# Patient Record
Sex: MALE | Race: WHITE | Employment: PART TIME | URBAN - METROPOLITAN AREA
[De-identification: names, ages, dates, MRNs, and addresses within clinical notes are randomized per-mention and may not be internally consistent; named-entity substitution may affect disease eponyms.]

---

## 2024-07-20 ENCOUNTER — HOSPITAL ENCOUNTER (INPATIENT)
Age: 74
LOS: 3 days | Discharge: HOME | End: 2024-07-23
Attending: STUDENT IN AN ORGANIZED HEALTH CARE EDUCATION/TRAINING PROGRAM | Admitting: HOSPITALIST
Payer: MEDICARE

## 2024-07-20 ENCOUNTER — APPOINTMENT (OUTPATIENT)
Dept: GENERAL RADIOLOGY | Age: 74
End: 2024-07-20
Payer: MEDICARE

## 2024-07-20 ENCOUNTER — APPOINTMENT (OUTPATIENT)
Dept: ULTRASOUND IMAGING | Age: 74
End: 2024-07-20
Payer: MEDICARE

## 2024-07-20 DIAGNOSIS — A41.9 SEPSIS, DUE TO UNSPECIFIED ORGANISM, UNSPECIFIED WHETHER ACUTE ORGAN DYSFUNCTION PRESENT (HCC): ICD-10-CM

## 2024-07-20 DIAGNOSIS — U07.1 COVID-19: Primary | ICD-10-CM

## 2024-07-20 DIAGNOSIS — R11.2 NAUSEA AND VOMITING, UNSPECIFIED VOMITING TYPE: ICD-10-CM

## 2024-07-20 DIAGNOSIS — I16.0 HYPERTENSIVE URGENCY: ICD-10-CM

## 2024-07-20 DIAGNOSIS — A49.9 UTI (URINARY TRACT INFECTION), BACTERIAL: ICD-10-CM

## 2024-07-20 DIAGNOSIS — N17.9 AKI (ACUTE KIDNEY INJURY) (HCC): ICD-10-CM

## 2024-07-20 DIAGNOSIS — N39.0 UTI (URINARY TRACT INFECTION), BACTERIAL: ICD-10-CM

## 2024-07-20 PROBLEM — N18.31 CKD STAGE 3A, GFR 45-59 ML/MIN (HCC): Status: ACTIVE | Noted: 2024-07-20

## 2024-07-20 PROBLEM — R65.20 SEPSIS WITH ACUTE RENAL FAILURE WITHOUT SEPTIC SHOCK, DUE TO UNSPECIFIED ORGANISM, UNSPECIFIED ACUTE RENAL FAILURE TYPE (HCC): Status: ACTIVE | Noted: 2024-07-20

## 2024-07-20 PROBLEM — J12.82 PNEUMONIA DUE TO COVID-19 VIRUS: Status: ACTIVE | Noted: 2024-07-20

## 2024-07-20 PROBLEM — R79.89 ELEVATED TROPONIN: Status: ACTIVE | Noted: 2024-07-20

## 2024-07-20 PROBLEM — N30.00 ACUTE CYSTITIS WITHOUT HEMATURIA: Status: ACTIVE | Noted: 2024-07-20

## 2024-07-20 PROBLEM — R65.20 SEPSIS WITH ACUTE RENAL FAILURE WITHOUT SEPTIC SHOCK (HCC): Status: ACTIVE | Noted: 2024-07-20

## 2024-07-20 LAB
ALBUMIN SERPL-MCNC: 3.7 G/DL (ref 3.5–5.2)
ALP SERPL-CCNC: 101 U/L (ref 40–129)
ALT SERPL-CCNC: 48 U/L (ref 0–40)
ANION GAP SERPL CALCULATED.3IONS-SCNC: 11 MMOL/L (ref 7–16)
AST SERPL-CCNC: 35 U/L (ref 0–39)
B PARAP IS1001 DNA NPH QL NAA+NON-PROBE: NOT DETECTED
B PERT DNA SPEC QL NAA+PROBE: NOT DETECTED
BACTERIA URNS QL MICRO: ABNORMAL
BASOPHILS # BLD: 0.04 K/UL (ref 0–0.2)
BASOPHILS NFR BLD: 0 % (ref 0–2)
BILIRUB SERPL-MCNC: 0.5 MG/DL (ref 0–1.2)
BILIRUB UR QL STRIP: NEGATIVE
BUN SERPL-MCNC: 30 MG/DL (ref 6–23)
C PNEUM DNA NPH QL NAA+NON-PROBE: NOT DETECTED
CALCIUM SERPL-MCNC: 8.4 MG/DL (ref 8.6–10.2)
CHLORIDE SERPL-SCNC: 102 MMOL/L (ref 98–107)
CLARITY UR: CLEAR
CO2 SERPL-SCNC: 22 MMOL/L (ref 22–29)
COLOR UR: YELLOW
CREAT SERPL-MCNC: 1.8 MG/DL (ref 0.7–1.2)
D-DIMER QUANTITATIVE: 637 NG/ML DDU (ref 0–230)
EOSINOPHIL # BLD: 0.07 K/UL (ref 0.05–0.5)
EOSINOPHILS RELATIVE PERCENT: 1 % (ref 0–6)
ERYTHROCYTE [DISTWIDTH] IN BLOOD BY AUTOMATED COUNT: 13.2 % (ref 11.5–15)
FLUAV RNA NPH QL NAA+NON-PROBE: NOT DETECTED
FLUBV RNA NPH QL NAA+NON-PROBE: NOT DETECTED
GFR, ESTIMATED: 38 ML/MIN/1.73M2
GLUCOSE SERPL-MCNC: 121 MG/DL (ref 74–99)
GLUCOSE UR STRIP-MCNC: NEGATIVE MG/DL
HADV DNA NPH QL NAA+NON-PROBE: NOT DETECTED
HCOV 229E RNA NPH QL NAA+NON-PROBE: NOT DETECTED
HCOV HKU1 RNA NPH QL NAA+NON-PROBE: NOT DETECTED
HCOV NL63 RNA NPH QL NAA+NON-PROBE: NOT DETECTED
HCOV OC43 RNA NPH QL NAA+NON-PROBE: NOT DETECTED
HCT VFR BLD AUTO: 45.8 % (ref 37–54)
HGB BLD-MCNC: 15 G/DL (ref 12.5–16.5)
HGB UR QL STRIP.AUTO: ABNORMAL
HMPV RNA NPH QL NAA+NON-PROBE: NOT DETECTED
HPIV1 RNA NPH QL NAA+NON-PROBE: NOT DETECTED
HPIV2 RNA NPH QL NAA+NON-PROBE: NOT DETECTED
HPIV3 RNA NPH QL NAA+NON-PROBE: NOT DETECTED
HPIV4 RNA NPH QL NAA+NON-PROBE: NOT DETECTED
IMM GRANULOCYTES # BLD AUTO: 0.05 K/UL (ref 0–0.58)
IMM GRANULOCYTES NFR BLD: 0 % (ref 0–5)
INR PPP: 2.6
KETONES UR STRIP-MCNC: NEGATIVE MG/DL
LACTATE BLDV-SCNC: 2.1 MMOL/L (ref 0.5–1.9)
LACTATE BLDV-SCNC: 2.2 MMOL/L (ref 0.5–1.9)
LEUKOCYTE ESTERASE UR QL STRIP: ABNORMAL
LIPASE SERPL-CCNC: 27 U/L (ref 13–60)
LYMPHOCYTES NFR BLD: 0.25 K/UL (ref 1.5–4)
LYMPHOCYTES RELATIVE PERCENT: 2 % (ref 20–42)
M PNEUMO DNA NPH QL NAA+NON-PROBE: NOT DETECTED
MCH RBC QN AUTO: 32.6 PG (ref 26–35)
MCHC RBC AUTO-ENTMCNC: 32.8 G/DL (ref 32–34.5)
MCV RBC AUTO: 99.6 FL (ref 80–99.9)
MONOCYTES NFR BLD: 0.07 K/UL (ref 0.1–0.95)
MONOCYTES NFR BLD: 1 % (ref 2–12)
NEUTROPHILS NFR BLD: 97 % (ref 43–80)
NEUTS SEG NFR BLD: 13.09 K/UL (ref 1.8–7.3)
NITRITE UR QL STRIP: NEGATIVE
PH UR STRIP: 5.5 [PH] (ref 5–9)
PLATELET # BLD AUTO: 266 K/UL (ref 130–450)
PMV BLD AUTO: 10.2 FL (ref 7–12)
POTASSIUM SERPL-SCNC: 4.3 MMOL/L (ref 3.5–5)
PROT SERPL-MCNC: 7.7 G/DL (ref 6.4–8.3)
PROT UR STRIP-MCNC: NEGATIVE MG/DL
PROTHROMBIN TIME: 27.9 SEC (ref 9.3–12.4)
RBC # BLD AUTO: 4.6 M/UL (ref 3.8–5.8)
RBC # BLD: ABNORMAL 10*6/UL
RBC #/AREA URNS HPF: ABNORMAL /HPF
RSV RNA NPH QL NAA+NON-PROBE: NOT DETECTED
RV+EV RNA NPH QL NAA+NON-PROBE: NOT DETECTED
SARS-COV-2 RDRP RESP QL NAA+PROBE: DETECTED
SARS-COV-2 RNA NPH QL NAA+NON-PROBE: NOT DETECTED
SODIUM SERPL-SCNC: 135 MMOL/L (ref 132–146)
SP GR UR STRIP: 1.01 (ref 1–1.03)
SPECIMEN DESCRIPTION: ABNORMAL
SPECIMEN DESCRIPTION: NORMAL
TROPONIN I SERPL HS-MCNC: 26 NG/L (ref 0–11)
TROPONIN I SERPL HS-MCNC: 28 NG/L (ref 0–11)
UROBILINOGEN UR STRIP-ACNC: 0.2 EU/DL (ref 0–1)
WBC #/AREA URNS HPF: ABNORMAL /HPF
WBC OTHER # BLD: 13.6 K/UL (ref 4.5–11.5)

## 2024-07-20 PROCEDURE — 6370000000 HC RX 637 (ALT 250 FOR IP): Performed by: STUDENT IN AN ORGANIZED HEALTH CARE EDUCATION/TRAINING PROGRAM

## 2024-07-20 PROCEDURE — 85610 PROTHROMBIN TIME: CPT

## 2024-07-20 PROCEDURE — 87086 URINE CULTURE/COLONY COUNT: CPT

## 2024-07-20 PROCEDURE — 84484 ASSAY OF TROPONIN QUANT: CPT

## 2024-07-20 PROCEDURE — 96375 TX/PRO/DX INJ NEW DRUG ADDON: CPT

## 2024-07-20 PROCEDURE — 93005 ELECTROCARDIOGRAM TRACING: CPT

## 2024-07-20 PROCEDURE — 71045 X-RAY EXAM CHEST 1 VIEW: CPT

## 2024-07-20 PROCEDURE — 87635 SARS-COV-2 COVID-19 AMP PRB: CPT

## 2024-07-20 PROCEDURE — 87040 BLOOD CULTURE FOR BACTERIA: CPT

## 2024-07-20 PROCEDURE — 76770 US EXAM ABDO BACK WALL COMP: CPT

## 2024-07-20 PROCEDURE — 87077 CULTURE AEROBIC IDENTIFY: CPT

## 2024-07-20 PROCEDURE — 6360000002 HC RX W HCPCS

## 2024-07-20 PROCEDURE — 83690 ASSAY OF LIPASE: CPT

## 2024-07-20 PROCEDURE — 80053 COMPREHEN METABOLIC PANEL: CPT

## 2024-07-20 PROCEDURE — 96374 THER/PROPH/DIAG INJ IV PUSH: CPT

## 2024-07-20 PROCEDURE — 2060000000 HC ICU INTERMEDIATE R&B

## 2024-07-20 PROCEDURE — 0202U NFCT DS 22 TRGT SARS-COV-2: CPT

## 2024-07-20 PROCEDURE — 87154 CUL TYP ID BLD PTHGN 6+ TRGT: CPT

## 2024-07-20 PROCEDURE — 2580000003 HC RX 258

## 2024-07-20 PROCEDURE — 83605 ASSAY OF LACTIC ACID: CPT

## 2024-07-20 PROCEDURE — 99285 EMERGENCY DEPT VISIT HI MDM: CPT

## 2024-07-20 PROCEDURE — 81001 URINALYSIS AUTO W/SCOPE: CPT

## 2024-07-20 PROCEDURE — 96361 HYDRATE IV INFUSION ADD-ON: CPT

## 2024-07-20 PROCEDURE — 2500000003 HC RX 250 WO HCPCS

## 2024-07-20 PROCEDURE — 85379 FIBRIN DEGRADATION QUANT: CPT

## 2024-07-20 PROCEDURE — 99223 1ST HOSP IP/OBS HIGH 75: CPT | Performed by: HOSPITALIST

## 2024-07-20 PROCEDURE — 6370000000 HC RX 637 (ALT 250 FOR IP)

## 2024-07-20 PROCEDURE — 85025 COMPLETE CBC W/AUTO DIFF WBC: CPT

## 2024-07-20 RX ORDER — ACETAMINOPHEN 500 MG
1000 TABLET ORAL ONCE
Status: COMPLETED | OUTPATIENT
Start: 2024-07-20 | End: 2024-07-20

## 2024-07-20 RX ORDER — 0.9 % SODIUM CHLORIDE 0.9 %
1655 INTRAVENOUS SOLUTION INTRAVENOUS ONCE
Status: COMPLETED | OUTPATIENT
Start: 2024-07-20 | End: 2024-07-20

## 2024-07-20 RX ORDER — 0.9 % SODIUM CHLORIDE 0.9 %
1000 INTRAVENOUS SOLUTION INTRAVENOUS ONCE
Status: COMPLETED | OUTPATIENT
Start: 2024-07-20 | End: 2024-07-20

## 2024-07-20 RX ORDER — IBUPROFEN 600 MG/1
600 TABLET ORAL ONCE
Status: COMPLETED | OUTPATIENT
Start: 2024-07-20 | End: 2024-07-20

## 2024-07-20 RX ADMIN — WATER 2000 MG: 1 INJECTION INTRAMUSCULAR; INTRAVENOUS; SUBCUTANEOUS at 21:53

## 2024-07-20 RX ADMIN — ACETAMINOPHEN 1000 MG: 500 TABLET ORAL at 18:47

## 2024-07-20 RX ADMIN — SODIUM CHLORIDE 1000 ML: 9 INJECTION, SOLUTION INTRAVENOUS at 18:01

## 2024-07-20 RX ADMIN — SODIUM CHLORIDE 1655 ML: 9 INJECTION, SOLUTION INTRAVENOUS at 18:00

## 2024-07-20 RX ADMIN — DOXYCYCLINE 100 MG: 100 INJECTION, POWDER, LYOPHILIZED, FOR SOLUTION INTRAVENOUS at 21:54

## 2024-07-20 RX ADMIN — IBUPROFEN 600 MG: 600 TABLET, FILM COATED ORAL at 19:55

## 2024-07-20 ASSESSMENT — PAIN - FUNCTIONAL ASSESSMENT: PAIN_FUNCTIONAL_ASSESSMENT: NONE - DENIES PAIN

## 2024-07-20 ASSESSMENT — LIFESTYLE VARIABLES
HOW OFTEN DO YOU HAVE A DRINK CONTAINING ALCOHOL: NEVER
HOW MANY STANDARD DRINKS CONTAINING ALCOHOL DO YOU HAVE ON A TYPICAL DAY: PATIENT DOES NOT DRINK

## 2024-07-20 ASSESSMENT — PAIN SCALES - GENERAL: PAINLEVEL_OUTOF10: 0

## 2024-07-20 NOTE — ED PROVIDER NOTES
Vaping Use    Vaping Use: Never used   Substance Use Topics    Alcohol use: No    Drug use: No       SCREENINGS     Is this patient to be included in the SEP-1 core measure? Yes   SEP-1 CORE MEASURE DATA      Sepsis Criteria   Severe Sepsis Criteria   Septic Shock Criteria     Must be confirmed or suspected to move forward with diagnosis of sepsis.    Must meet 2:    [x] Temperature > 100.9 F (38.3 C)        or < 96.8 F (36 C)  [x] HR > 90  [] RR > 20  [x] WBC > 12 or < 4 or 10% bands      AND:      [x] Infection Confirmed or        Suspected.     Must meet 1:    [x] Lactate > 2       or   [] Signs of Organ Dysfunction:    - SBP < 90 or MAP < 65  - Altered mental status  - Creatinine > 2 or increased from      baseline  - Urine Output < 0.5 ml/kg/hr  - Bilirubin > 2  - INR > 1.5 (not anticoagulated)  - Platelets < 100,000  - Acute Respiratory Failure as     evidenced by new need for NIPPV     or mechanical ventilation      [] No criteria met for Severe Sepsis.   Must meet 1:    [] Lactate > 4        or   [] SBP < 90 or MAP < 65 for at        least two readings in the first        hour after fluid bolus        administration      [] Vasopressors initiated (if hypotension persists after fluid resuscitation)        [] No criteria met for Septic Shock.   Patient Vitals for the past 6 hrs:   BP Temp Pulse Resp SpO2   07/20/24 1755 -- -- (!) 101 26 98 %   07/20/24 1810 (!) 160/86 -- (!) 103 20 96 %   07/20/24 1825 -- -- (!) 106 (!) 32 96 %   07/20/24 1840 128/63 -- (!) 107 23 96 %   07/20/24 1855 -- -- (!) 112 25 97 %   07/20/24 1910 -- -- (!) 110 27 96 %   07/20/24 1921 -- -- (!) 114 19 --   07/20/24 1936 -- -- (!) 124 29 --   07/20/24 1943 (!) 152/94 (!) 103 °F (39.4 °C) (!) 111 22 93 %   07/20/24 1951 -- -- (!) 107 28 93 %   07/20/24 2006 123/73 -- (!) 105 26 93 %   07/20/24 2021 -- -- (!) 105 23 --   07/20/24 2036 -- -- (!) 103 21 --   07/20/24 2051 -- -- (!) 101 22 --   07/20/24 2106 -- -- (!) 101 21 --   07/20/24  otherwise negative for acute findings.  Patient has improved clinically and hemodynamically.  Spoke with hospitalist who accepts patient for admission.    Extensively reviewed laboratory and imaging findings with patient/family members/available representative parties and all questions answered at this time to the fullest extent possible.  Shared decision making utilized with patient and present parties, and due to their presenting conditions patient to be admitted to the hospital for inpatient management and monitoring.  Patient has remained closely monitored with multiple reevaluations and complex medical decision making throughout the course of their emergency department stay.  They have clinically improved and have hemodynamically remained stable.  After emergency department management is complete, patient to be admitted to the hospital.    Differential diagnosis includes but is not limited to: Pneumonia, ACS, pneumothorax, sepsis    Please refer to the ED Course as available for additional MDM.  ED Course as of 07/20/24 2351   Sat Jul 20, 2024   1832 SARS-CoV-2, Rapid(!): DETECTED [VG]   2025 Lactic Acid, Sepsis(!): 2.2 [VG]   2126 Spoke with Dr Reis, discussed case, patient is accepted for admission.  [VG]   2349 EKG: Sinus tachycardia at a rate of 103 bpm with left axis deviation and QTc of 453.  No significant ST changes or elevation with no prior to compare to at this time.  EKG interpreted by myself. [RW]      ED Course User Index  [RW] Dave Newberry DO  [VG] Sabrina Puente DO        Social Determinants affecting Dx or Tx: Patient has good medical compliance and fluency as well as established follow-up with family physician.    Records Reviewed: Prior hospital encounter on 8/30/2017 reviewed patient was evaluated for dental abscess.    I am the Primary Clinician of Record.    CONSULTS: (Who and What was discussed)  IP CONSULT TO NEPHROLOGY  IP CONSULT TO CASE MANAGEMENT    FINAL IMPRESSION

## 2024-07-21 ENCOUNTER — APPOINTMENT (OUTPATIENT)
Dept: CT IMAGING | Age: 74
End: 2024-07-21
Payer: MEDICARE

## 2024-07-21 ENCOUNTER — APPOINTMENT (OUTPATIENT)
Dept: GENERAL RADIOLOGY | Age: 74
End: 2024-07-21
Payer: MEDICARE

## 2024-07-21 LAB
ALBUMIN SERPL-MCNC: 3.8 G/DL (ref 3.5–5.2)
ALP SERPL-CCNC: 127 U/L (ref 40–129)
ALT SERPL-CCNC: 146 U/L (ref 0–40)
ANION GAP SERPL CALCULATED.3IONS-SCNC: 11 MMOL/L (ref 7–16)
ANION GAP SERPL CALCULATED.3IONS-SCNC: 20 MMOL/L (ref 7–16)
AST SERPL-CCNC: 82 U/L (ref 0–39)
B.E.: -15.3 MMOL/L (ref -3–3)
BASOPHILS # BLD: 0 K/UL (ref 0–0.2)
BASOPHILS # BLD: 0.01 K/UL (ref 0–0.2)
BASOPHILS NFR BLD: 0 % (ref 0–2)
BASOPHILS NFR BLD: 0 % (ref 0–2)
BILIRUB SERPL-MCNC: 0.4 MG/DL (ref 0–1.2)
BUN SERPL-MCNC: 29 MG/DL (ref 6–23)
BUN SERPL-MCNC: 32 MG/DL (ref 6–23)
CALCIUM SERPL-MCNC: 8.3 MG/DL (ref 8.6–10.2)
CALCIUM SERPL-MCNC: 8.9 MG/DL (ref 8.6–10.2)
CHLORIDE SERPL-SCNC: 102 MMOL/L (ref 98–107)
CHLORIDE SERPL-SCNC: 109 MMOL/L (ref 98–107)
CK SERPL-CCNC: 61 U/L (ref 20–200)
CO2 SERPL-SCNC: 18 MMOL/L (ref 22–29)
CO2 SERPL-SCNC: 21 MMOL/L (ref 22–29)
COHB: 0.5 % (ref 0–1.5)
CREAT SERPL-MCNC: 1.8 MG/DL (ref 0.7–1.2)
CREAT SERPL-MCNC: 1.9 MG/DL (ref 0.7–1.2)
CREAT UR-MCNC: 77 MG/DL (ref 40–278)
CRITICAL: ABNORMAL
DATE ANALYZED: ABNORMAL
DATE OF COLLECTION: ABNORMAL
EOSINOPHIL # BLD: 0 K/UL (ref 0.05–0.5)
EOSINOPHIL # BLD: 0.01 K/UL (ref 0.05–0.5)
EOSINOPHILS RELATIVE PERCENT: 0 % (ref 0–6)
EOSINOPHILS RELATIVE PERCENT: 0 % (ref 0–6)
ERYTHROCYTE [DISTWIDTH] IN BLOOD BY AUTOMATED COUNT: 13.3 % (ref 11.5–15)
ERYTHROCYTE [DISTWIDTH] IN BLOOD BY AUTOMATED COUNT: 13.6 % (ref 11.5–15)
ERYTHROCYTE [DISTWIDTH] IN BLOOD BY AUTOMATED COUNT: 13.7 % (ref 11.5–15)
GFR, ESTIMATED: 36 ML/MIN/1.73M2
GFR, ESTIMATED: 38 ML/MIN/1.73M2
GLUCOSE SERPL-MCNC: 127 MG/DL (ref 74–99)
GLUCOSE SERPL-MCNC: 129 MG/DL (ref 74–99)
HCO3: 13.3 MMOL/L (ref 22–26)
HCT VFR BLD AUTO: 43.3 % (ref 37–54)
HCT VFR BLD AUTO: 48.4 % (ref 37–54)
HCT VFR BLD AUTO: 48.8 % (ref 37–54)
HGB BLD-MCNC: 14.2 G/DL (ref 12.5–16.5)
HGB BLD-MCNC: 15.2 G/DL (ref 12.5–16.5)
HGB BLD-MCNC: 15.4 G/DL (ref 12.5–16.5)
HHB: 1.8 % (ref 0–5)
IMM GRANULOCYTES # BLD AUTO: 0.03 K/UL (ref 0–0.58)
IMM GRANULOCYTES NFR BLD: 1 % (ref 0–5)
INR PPP: 2.5
INR PPP: 4.1
LAB: ABNORMAL
LACTATE BLDV-SCNC: 1.7 MMOL/L (ref 0.5–1.9)
LACTATE BLDV-SCNC: 2 MMOL/L (ref 0.5–1.9)
LACTATE BLDV-SCNC: 2.5 MMOL/L (ref 0.5–2.2)
LACTATE BLDV-SCNC: 9.8 MMOL/L (ref 0.5–2.2)
LYMPHOCYTES NFR BLD: 0.42 K/UL (ref 1.5–4)
LYMPHOCYTES NFR BLD: 1.21 K/UL (ref 1.5–4)
LYMPHOCYTES RELATIVE PERCENT: 2 % (ref 20–42)
LYMPHOCYTES RELATIVE PERCENT: 21 % (ref 20–42)
Lab: 2035
MCH RBC QN AUTO: 32.1 PG (ref 26–35)
MCH RBC QN AUTO: 32.8 PG (ref 26–35)
MCH RBC QN AUTO: 32.9 PG (ref 26–35)
MCHC RBC AUTO-ENTMCNC: 31.4 G/DL (ref 32–34.5)
MCHC RBC AUTO-ENTMCNC: 31.6 G/DL (ref 32–34.5)
MCHC RBC AUTO-ENTMCNC: 32.8 G/DL (ref 32–34.5)
MCV RBC AUTO: 100.2 FL (ref 80–99.9)
MCV RBC AUTO: 102.1 FL (ref 80–99.9)
MCV RBC AUTO: 103.8 FL (ref 80–99.9)
METHB: 0.3 % (ref 0–1.5)
MONOCYTES NFR BLD: 0.17 K/UL (ref 0.1–0.95)
MONOCYTES NFR BLD: 1.05 K/UL (ref 0.1–0.95)
MONOCYTES NFR BLD: 3 % (ref 2–12)
MONOCYTES NFR BLD: 4 % (ref 2–12)
NEUTROPHILS NFR BLD: 75 % (ref 43–80)
NEUTROPHILS NFR BLD: 94 % (ref 43–80)
NEUTS SEG NFR BLD: 22.63 K/UL (ref 1.8–7.3)
NEUTS SEG NFR BLD: 4.24 K/UL (ref 1.8–7.3)
O2 CONTENT: 21.6 ML/DL
O2 SATURATION: 98.2 % (ref 92–98.5)
O2HB: 97.4 % (ref 94–97)
OPERATOR ID: 1115
PATIENT TEMP: 37 C
PCO2: 40.6 MMHG (ref 35–45)
PH BLOOD GAS: 7.13 (ref 7.35–7.45)
PLATELET # BLD AUTO: 220 K/UL (ref 130–450)
PLATELET # BLD AUTO: 256 K/UL (ref 130–450)
PLATELET # BLD AUTO: 363 K/UL (ref 130–450)
PMV BLD AUTO: 10.7 FL (ref 7–12)
PMV BLD AUTO: 10.9 FL (ref 7–12)
PMV BLD AUTO: 11 FL (ref 7–12)
PO2: 139.5 MMHG (ref 75–100)
POTASSIUM SERPL-SCNC: 4.1 MMOL/L (ref 3.5–5)
POTASSIUM SERPL-SCNC: 5.2 MMOL/L (ref 3.5–5)
PROCALCITONIN SERPL-MCNC: 12.4 NG/ML (ref 0–0.08)
PROT SERPL-MCNC: 8.2 G/DL (ref 6.4–8.3)
PROTHROMBIN TIME: 27.6 SEC (ref 9.3–12.4)
PROTHROMBIN TIME: 43.6 SEC (ref 9.3–12.4)
RBC # BLD AUTO: 4.32 M/UL (ref 3.8–5.8)
RBC # BLD AUTO: 4.7 M/UL (ref 3.8–5.8)
RBC # BLD AUTO: 4.74 M/UL (ref 3.8–5.8)
RBC # BLD: ABNORMAL 10*6/UL
SODIUM SERPL-SCNC: 140 MMOL/L (ref 132–146)
SODIUM SERPL-SCNC: 141 MMOL/L (ref 132–146)
SODIUM UR-SCNC: 53 MMOL/L
SOURCE, BLOOD GAS: ABNORMAL
THB: 15.6 G/DL (ref 11.5–16.5)
TIME ANALYZED: 2038
TROPONIN I SERPL HS-MCNC: 25 NG/L (ref 0–11)
TROPONIN I SERPL HS-MCNC: 64 NG/L (ref 0–11)
TROPONIN I SERPL HS-MCNC: NORMAL NG/L (ref 0–22)
TROPONIN INTERP: NORMAL
TROPONIN T SERPL-MCNC: NORMAL NG/ML
URATE SERPL-MCNC: 5.1 MG/DL (ref 3.4–7)
WBC OTHER # BLD: 12.2 K/UL (ref 4.5–11.5)
WBC OTHER # BLD: 24.1 K/UL (ref 4.5–11.5)
WBC OTHER # BLD: 5.7 K/UL (ref 4.5–11.5)

## 2024-07-21 PROCEDURE — 2580000003 HC RX 258: Performed by: STUDENT IN AN ORGANIZED HEALTH CARE EDUCATION/TRAINING PROGRAM

## 2024-07-21 PROCEDURE — 85027 COMPLETE CBC AUTOMATED: CPT

## 2024-07-21 PROCEDURE — 6370000000 HC RX 637 (ALT 250 FOR IP): Performed by: HOSPITALIST

## 2024-07-21 PROCEDURE — 6360000002 HC RX W HCPCS

## 2024-07-21 PROCEDURE — 99233 SBSQ HOSP IP/OBS HIGH 50: CPT | Performed by: HOSPITALIST

## 2024-07-21 PROCEDURE — 84145 PROCALCITONIN (PCT): CPT

## 2024-07-21 PROCEDURE — 94640 AIRWAY INHALATION TREATMENT: CPT

## 2024-07-21 PROCEDURE — 85610 PROTHROMBIN TIME: CPT

## 2024-07-21 PROCEDURE — 6360000002 HC RX W HCPCS: Performed by: STUDENT IN AN ORGANIZED HEALTH CARE EDUCATION/TRAINING PROGRAM

## 2024-07-21 PROCEDURE — 82570 ASSAY OF URINE CREATININE: CPT

## 2024-07-21 PROCEDURE — 82550 ASSAY OF CK (CPK): CPT

## 2024-07-21 PROCEDURE — 2500000003 HC RX 250 WO HCPCS: Performed by: HOSPITALIST

## 2024-07-21 PROCEDURE — 74176 CT ABD & PELVIS W/O CONTRAST: CPT

## 2024-07-21 PROCEDURE — 80053 COMPREHEN METABOLIC PANEL: CPT

## 2024-07-21 PROCEDURE — 82805 BLOOD GASES W/O2 SATURATION: CPT

## 2024-07-21 PROCEDURE — 99291 CRITICAL CARE FIRST HOUR: CPT | Performed by: STUDENT IN AN ORGANIZED HEALTH CARE EDUCATION/TRAINING PROGRAM

## 2024-07-21 PROCEDURE — 2060000000 HC ICU INTERMEDIATE R&B

## 2024-07-21 PROCEDURE — 71045 X-RAY EXAM CHEST 1 VIEW: CPT

## 2024-07-21 PROCEDURE — 93005 ELECTROCARDIOGRAM TRACING: CPT | Performed by: HOSPITALIST

## 2024-07-21 PROCEDURE — 80048 BASIC METABOLIC PNL TOTAL CA: CPT

## 2024-07-21 PROCEDURE — 84484 ASSAY OF TROPONIN QUANT: CPT

## 2024-07-21 PROCEDURE — 2500000003 HC RX 250 WO HCPCS

## 2024-07-21 PROCEDURE — 85025 COMPLETE CBC W/AUTO DIFF WBC: CPT

## 2024-07-21 PROCEDURE — 6360000002 HC RX W HCPCS: Performed by: HOSPITALIST

## 2024-07-21 PROCEDURE — 3E0333Z INTRODUCTION OF ANTI-INFLAMMATORY INTO PERIPHERAL VEIN, PERCUTANEOUS APPROACH: ICD-10-PCS | Performed by: STUDENT IN AN ORGANIZED HEALTH CARE EDUCATION/TRAINING PROGRAM

## 2024-07-21 PROCEDURE — 84550 ASSAY OF BLOOD/URIC ACID: CPT

## 2024-07-21 PROCEDURE — 84300 ASSAY OF URINE SODIUM: CPT

## 2024-07-21 PROCEDURE — 2580000003 HC RX 258: Performed by: HOSPITALIST

## 2024-07-21 PROCEDURE — 2580000003 HC RX 258

## 2024-07-21 PROCEDURE — 83605 ASSAY OF LACTIC ACID: CPT

## 2024-07-21 RX ORDER — LEVALBUTEROL INHALATION SOLUTION 0.63 MG/3ML
1.89 SOLUTION RESPIRATORY (INHALATION) ONCE
Status: COMPLETED | OUTPATIENT
Start: 2024-07-21 | End: 2024-07-21

## 2024-07-21 RX ORDER — MORPHINE SULFATE 2 MG/ML
2 INJECTION, SOLUTION INTRAMUSCULAR; INTRAVENOUS ONCE
Status: COMPLETED | OUTPATIENT
Start: 2024-07-21 | End: 2024-07-21

## 2024-07-21 RX ORDER — SODIUM CHLORIDE, SODIUM LACTATE, POTASSIUM CHLORIDE, CALCIUM CHLORIDE 600; 310; 30; 20 MG/100ML; MG/100ML; MG/100ML; MG/100ML
INJECTION, SOLUTION INTRAVENOUS CONTINUOUS
Status: DISCONTINUED | OUTPATIENT
Start: 2024-07-21 | End: 2024-07-21

## 2024-07-21 RX ORDER — LEVALBUTEROL INHALATION SOLUTION 0.63 MG/3ML
0.63 SOLUTION RESPIRATORY (INHALATION) EVERY 8 HOURS PRN
Status: DISCONTINUED | OUTPATIENT
Start: 2024-07-21 | End: 2024-07-21 | Stop reason: SDUPTHER

## 2024-07-21 RX ORDER — LEVALBUTEROL INHALATION SOLUTION 0.63 MG/3ML
0.63 SOLUTION RESPIRATORY (INHALATION) EVERY 8 HOURS PRN
Status: DISCONTINUED | OUTPATIENT
Start: 2024-07-21 | End: 2024-07-23 | Stop reason: HOSPADM

## 2024-07-21 RX ORDER — ONDANSETRON 2 MG/ML
INJECTION INTRAMUSCULAR; INTRAVENOUS
Status: COMPLETED
Start: 2024-07-21 | End: 2024-07-21

## 2024-07-21 RX ORDER — ACETAMINOPHEN 325 MG/1
650 TABLET ORAL EVERY 6 HOURS PRN
Status: DISCONTINUED | OUTPATIENT
Start: 2024-07-21 | End: 2024-07-23 | Stop reason: HOSPADM

## 2024-07-21 RX ORDER — WARFARIN SODIUM 5 MG/1
5 TABLET ORAL DAILY
Status: DISCONTINUED | OUTPATIENT
Start: 2024-07-21 | End: 2024-07-21

## 2024-07-21 RX ORDER — DEXAMETHASONE SODIUM PHOSPHATE 10 MG/ML
6 INJECTION INTRAMUSCULAR; INTRAVENOUS DAILY
Status: DISCONTINUED | OUTPATIENT
Start: 2024-07-21 | End: 2024-07-22

## 2024-07-21 RX ORDER — LORATADINE 10 MG/1
10 CAPSULE, LIQUID FILLED ORAL DAILY
COMMUNITY

## 2024-07-21 RX ORDER — SODIUM CHLORIDE 0.9 % (FLUSH) 0.9 %
5-40 SYRINGE (ML) INJECTION EVERY 12 HOURS SCHEDULED
Status: DISCONTINUED | OUTPATIENT
Start: 2024-07-21 | End: 2024-07-23 | Stop reason: HOSPADM

## 2024-07-21 RX ORDER — SODIUM CHLORIDE 0.9 % (FLUSH) 0.9 %
5-40 SYRINGE (ML) INJECTION PRN
Status: DISCONTINUED | OUTPATIENT
Start: 2024-07-21 | End: 2024-07-23 | Stop reason: HOSPADM

## 2024-07-21 RX ORDER — ACETAMINOPHEN 650 MG/1
650 SUPPOSITORY RECTAL EVERY 6 HOURS PRN
Status: DISCONTINUED | OUTPATIENT
Start: 2024-07-21 | End: 2024-07-23 | Stop reason: HOSPADM

## 2024-07-21 RX ORDER — SODIUM CHLORIDE 9 MG/ML
INJECTION, SOLUTION INTRAVENOUS PRN
Status: DISCONTINUED | OUTPATIENT
Start: 2024-07-21 | End: 2024-07-23 | Stop reason: HOSPADM

## 2024-07-21 RX ORDER — WARFARIN SODIUM 5 MG/1
5 TABLET ORAL
Status: COMPLETED | OUTPATIENT
Start: 2024-07-21 | End: 2024-07-21

## 2024-07-21 RX ADMIN — SODIUM CHLORIDE, POTASSIUM CHLORIDE, SODIUM LACTATE AND CALCIUM CHLORIDE: 600; 310; 30; 20 INJECTION, SOLUTION INTRAVENOUS at 02:41

## 2024-07-21 RX ADMIN — WATER 2000 MG: 1 INJECTION INTRAMUSCULAR; INTRAVENOUS; SUBCUTANEOUS at 22:28

## 2024-07-21 RX ADMIN — WARFARIN SODIUM 5 MG: 5 TABLET ORAL at 18:25

## 2024-07-21 RX ADMIN — LEVALBUTEROL 1.89 MG: 0.63 SOLUTION RESPIRATORY (INHALATION) at 20:36

## 2024-07-21 RX ADMIN — DOXYCYCLINE 100 MG: 100 INJECTION, POWDER, LYOPHILIZED, FOR SOLUTION INTRAVENOUS at 09:19

## 2024-07-21 RX ADMIN — MORPHINE SULFATE 2 MG: 2 INJECTION, SOLUTION INTRAMUSCULAR; INTRAVENOUS at 20:37

## 2024-07-21 RX ADMIN — ACETAMINOPHEN 650 MG: 325 TABLET ORAL at 20:40

## 2024-07-21 RX ADMIN — SODIUM CHLORIDE, PRESERVATIVE FREE 10 ML: 5 INJECTION INTRAVENOUS at 22:27

## 2024-07-21 RX ADMIN — SODIUM BICARBONATE: 84 INJECTION, SOLUTION INTRAVENOUS at 21:06

## 2024-07-21 RX ADMIN — SODIUM CHLORIDE, PRESERVATIVE FREE 10 ML: 5 INJECTION INTRAVENOUS at 09:20

## 2024-07-21 RX ADMIN — ONDANSETRON 4 MG: 2 INJECTION INTRAMUSCULAR; INTRAVENOUS at 21:02

## 2024-07-21 RX ADMIN — DEXAMETHASONE SODIUM PHOSPHATE 6 MG: 10 INJECTION INTRAMUSCULAR; INTRAVENOUS at 02:38

## 2024-07-21 NOTE — PROGRESS NOTES
P Quality Flow/Interdisciplinary Rounds Progress Note        Quality Flow Rounds held on July 21, 2024    Disciplines Attending:  Bedside Nurse and Nursing Unit Leadership    Paresh Gallego was admitted on 7/20/2024  5:14 PM    Anticipated Discharge Date:       Disposition:    Mina Score:  Mina Scale Score: 20    Readmission Risk              Risk of Unplanned Readmission:  15           Discussed patient goal for the day, patient clinical progression, and barriers to discharge.  The following Goal(s) of the Day/Commitment(s) have been identified:   monitor labs, hr, iv atb.       Aparna Easton RN  July 21, 2024

## 2024-07-21 NOTE — ED NOTES
Ice packs applied to bilateral armpits and bilateral leg/buttocks area, cool cloth applied to head and neck.

## 2024-07-21 NOTE — CONSULTS
Nephrology Consult  [facility]  Igor Galloway. 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 He was started on doxy and rocephin and LR at 75. GERALD showed no hydro and diffuse urinary bladder wall thickening. Cxr showed small R pleural eff. Resp panel shows covid 19 and he is on RA, he received a ns bolus in the ER. Labs show na 141, k 4.1, co2 21, bun 29, cr 1.9, ca 8.3, uric acid 5.1, procal 12.4, alb 3.7, alt 48, ast 35, wbc 13>24, hgb 15.2, plt 256, ck 61, d dimer 637. Ua shows sm LE, 6-9 wbc, 1+ bacteria, tr hgb. Vitals show emp 98.4, rr 18, hr 78, bp 128/79. Pt is on RA with 97% sat. Last cr is from 7/12/13 and was 1.3. pt was given ibuprofen.     He was taking nsaids at home for 4-5 days, not eating well and having fever and chills. Denies diarrhea, he threw up today. No hematuria, wears a diaper. No cp, headache, rash.     PMH:    Past Medical History:   Diagnosis Date    Cancer (HCC)     bladder    DVT (deep venous thrombosis) (HCC)     Pulmonary embolism (HCC)        Patient Active Problem List   Diagnosis    History of Pulmonary embolus (HCC)    Bladder cancer (HCC)    Pneumonia due to COVID-19 virus    CKD stage 3a, GFR 45-59 ml/min (HCC)    WOLFGANG (acute kidney injury) (HCC)    Elevated troponin    Sepsis with acute renal failure without septic shock (HCC)    Sepsis with acute renal failure without septic shock, due to unspecified organism, unspecified acute renal failure type (HCC)    Acute cystitis without hematuria       Meds:     sodium chloride flush  5-40 mL IntraVENous 2 times per day    cefTRIAXone (ROCEPHIN) IV  1,000 mg IntraVENous Q24H    doxycycline (VIBRAMYCIN) IV  100 mg IntraVENous Q12H    dexAMETHasone  6 mg IntraVENous Daily    warfarin  5 mg Oral Once    warfarin placeholder: dosing by pharmacy   Other

## 2024-07-21 NOTE — PROGRESS NOTES
Pharmacy Consultation Note  (Warfarin Dosing and Monitoring)    Initial consult date: 7/21/24  Consulting Provider: Dr. Chato NERI Lashonda is a 74 y.o. male for whom pharmacy has been asked to manage warfarin therapy.     Weight:   Wt Readings from Last 1 Encounters:   07/20/24 88.5 kg (195 lb)       TSH:  No results found for: \"TSH\"    Hepatic Function Panel:                            Lab Results   Component Value Date/Time    ALKPHOS 101 07/20/2024 05:45 PM    ALT 48 07/20/2024 05:45 PM    AST 35 07/20/2024 05:45 PM    BILITOT 0.5 07/20/2024 05:45 PM       Current significant warfarin drug-drug interactions include: steroids (variable/dose dependent)    Recent Labs     07/20/24  1745 07/21/24  0200   HGB 15.0 15.2    256     Date Warfarin Dose INR Heparin or LMWH Comment   7/21 5 mg 2.5 --                                  Assessment:  Patient is a 74 y.o. male on warfarin for History of  VTE/PE.  Patient's home warfarin dosing regimen is warfarin 5mg daily.   Goal INR 2 - 3  INR 2.5 today    Plan:  Warfarin 5 mg tonight  Daily PT/INR until the INR is stable within the therapeutic range  Pharmacist will follow and monitor/adjust dosing as necessary    Thank you for this consult,    Danielle Rosenberg, PharmD, BCPS, BCCCP 7/21/2024 9:18 AM

## 2024-07-21 NOTE — PROGRESS NOTES
Internal Medicine Progress Note    Patient's name: Paresh Gallego  : 1950  Admission date: 2024  Date of service: 2024   Room: [address] INTERMEDIATE  Primary care physician: Live Freeman MD  Reason for visit: Follow-up for Sepsis with COVID-19 pneumonia, UTI, WOLFGANG on CKD3a, elevated troponin,  Hx of PE on warfarin, and 1/2 blood culture turned positive for Enterobacterales and Klebsiella pneumoniae on 2024    Subjective  Paresh was seen and examined at bedside.  Patient was seen on room air, no acute distress, denies fever, chills, cough, shortness of breath, chest pain, chest pressure, denies nausea vomiting or diarrhea.    I have personally reviewed and interpreted the most recent labs and imaging studies as summarized below:     WBC 13.6--> 24.1, stable hemoglobin/hematocrit within normal range, platelet count is normal range  Renal function stable overnight, remains in WOLFGANG on CKD 3  Hepatic function   stable  Us RENAL  IMPRESSION:  1. Diffuse urinary bladder wall thickening.  2. Unremarkable ultrasound of the kidneys.        CXR  reviewed, small right-sided pleural effusion  Troponin 26--28  EKG reviewed sinus rhythm with mild sinus tachycardia, mild first-degree AV block with no acute ST/T wave ischemic change.  Last ECHO in , report not available        Review of Systems  There are no new complaints of chest pain, chest tightness, shortness of breath, cough, abdominal pain, vision change, nausea, vomiting, diarrhea, constipation.    Hospital Medications  Current Facility-Administered Medications   Medication Dose Route Frequency Provider Last Rate Last Admin    sodium chloride flush 0.9 % injection 5-40 mL  5-40 mL IntraVENous 2 times per day Nawaf Reis MD   10 mL at 24 0920    sodium chloride flush 0.9 % injection 5-40 mL  5-40 mL IntraVENous PRN Nawaf Reis MD        0.9 % sodium chloride infusion   IntraVENous PRN Nawaf Reis MD        acetaminophen (TYLENOL) tablet 650  7am and 7pm, and name could not be updated timely, please contact [facility] Hospitalist if staff has difficulty getting in touch with the attending name listed.     DVT Prophylaxis: []Lovenox []Heparin []PCD [] Warfarin/NOAC []Encouraged ambulation    Diet: ADULT DIET; Regular  Code Status: Full Code  Surrogate decision maker confirmed with patient:   Extended Emergency Contact Information  Primary Emergency Contact: Brigid Gallego  Address: [address] [place] [address] [place] [address] [facility]  Home Phone: [phone]  Relation: Spouse  Secondary Emergency Contact: None,Per Pt  Relation: Other    Admit status: [] Observation [x] Inpatient   Disposition/reason for continued hospitalization: Not medically stable, PT/OT to assess, patient has positive blood culture and WOLFGANG    +++++++++++++++++++++++++++++++++++++++++++++++++  Nawaf Reis MD PhD  Hospital Medicine  [place], [place]  +++++++++++++++++++++++++++++++++++++++++++++++++  NOTE: Report could be transcribed using voice recognition software. Every effort was made to ensure accuracy; however, inadvertent computerized transcription errors may be present.    Electronically signed by Nawaf Reis MD on 7/21/2024 at 12:05 PM    I can be reached through [employer].

## 2024-07-21 NOTE — PROGRESS NOTES
4 Eyes Skin Assessment     NAME:  Paresh Gallego  YOB: 1950  MEDICAL RECORD NUMBER:  [id number]    The patient is being assessed for  Admission    I agree that at least one RN has performed a thorough Head to Toe Skin Assessment on the patient. ALL assessment sites listed below have been assessed.      Areas assessed by both nurses:    Head, Face, Ears, Shoulders, Back, Chest, Arms, Elbows, Hands, Sacrum. Buttock, Coccyx, Ischium, Legs. Feet and Heels, and Under Medical Devices         Does the Patient have a Wound? No noted wound(s)       Mina Prevention initiated by RN: No  Wound Care Orders initiated by RN: No    Pressure Injury (Stage 3,4, Unstageable, DTI, NWPT, and Complex wounds) if present, place Wound referral order by RN under : No    New Ostomies, if present place, Ostomy referral order under : No     Nurse 1 eSignature: Electronically signed by Elio Batista RN on 7/21/24 at 3:45 AM EDT    **SHARE this note so that the co-signing nurse can place an eSignature**    Nurse 2 eSignature: Electronically signed by Eugenia Blackburn RN on 7/21/24 at 4:22 AM EDT

## 2024-07-21 NOTE — CONSULTS
[facility]  [facility]    Consultation Note     Admit Date: 7/20/2024  5:14 PM    Reason for Consult:       Attending Physician:  Nawaf Reis MD     Chief Complaint: fever    HISTORY OF PRESENT ILLNESS:   The patient is a 74 y.o.  male not known to the Infectious Diseases service. The patient presented to the ER yesterday with nausea vomiting for the past couple days along with fever for the past 12 days. His daughter had COVId 2 weeks ago and he had fever and URI with cough. His URI symptoms gotten better but the fever did not go away. Patient does not have abdominal pain. Has burning urination. He had hx of Bladder cancer s./p cystectomy and reconstruction of the bladder around 10 yrs ago at [facility]. He thinks he is not emptying the bladder. He has to push since the surgery. No nausea/vomiting or diarrhea. No Sob or chest pain.     On admission patient had a temp of 103 tachycardic blood pressure has been stable saturating well on room air.  Labs showed count was 13 yesterday today's 24 hemoglobin is 15.2 platelet count is 256 LFTs showed mildly elevated ALT at 48 creatinine is 1.9/BUN of 29 blood cultures 1 out of 2 turned positive for gram-negative rods BC ID showed Kleb pneumonia.  No resistance mutations detected.  Patient also tested positive for COVID.  UA shows mild pyuria.  Chest x-ray right pleural effusion.  Ultrasound retroperitoneal showed bladder wall thickening.  Patient is on IV ceftriaxone and doxycycline.  I got consult further recommendations.    Past Medical History:        Diagnosis Date    Cancer (HCC)     bladder    DVT (deep venous thrombosis) (HCC)     Pulmonary embolism (HCC)        Past Surgical History:        Procedure Laterality Date    BLADDER REMOVAL      ECHO COMPL W DOP COLOR FLOW  2/29/2012         PROSTATECTOMY      URETER REVISION         Current Medications:   Scheduled Meds:   sodium chloride flush  5-40 mL IntraVENous 2 times per day    cefTRIAXone

## 2024-07-21 NOTE — H&P
Date    DDIMER 637 (H) 07/20/2024      Folate and B12: No results found for: \"MJRCXJDI13\", No results found for: \"FOLATE\"  Lactic Acid: No results found for: \"LACTA\"  Thyroid Studies: No results found for: \"TSH\", \"L2FNDUV\", \"THYROIDAB\"    Oupatient labs:  Lab Results   Component Value Date    INR 2.6 07/20/2024       Urinalysis:    Lab Results   Component Value Date/Time    NITRU NEGATIVE 07/20/2024 05:45 PM    WBCUA 6 TO 9 07/20/2024 05:45 PM    BACTERIA 1+ 07/20/2024 05:45 PM    RBCUA 0 TO 2 07/20/2024 05:45 PM    BLOODU LARGE 09/07/2015 08:03 PM    GLUCOSEU NEGATIVE 07/20/2024 05:45 PM       Imaging:  XR CHEST PORTABLE    Result Date: 7/20/2024  EXAMINATION: ONE XRAY VIEW OF THE CHEST 7/20/2024 5:34 pm COMPARISON: None. HISTORY: ORDERING SYSTEM PROVIDED HISTORY: cough and congestion TECHNOLOGIST PROVIDED HISTORY: Reason for exam:->cough and congestion FINDINGS: The lungs are without acute focal process.  Right pleural effusion.  No pneumothorax.  The cardiomediastinal silhouette is without acute process. The osseous structures are without acute process.     Right pleural effusion. No definitive evidence for pneumonia.       ASSESSMENT & PLAN::    Principal Problem:    Sepsis with acute renal failure without septic shock ([facility])  Active Problems:    History of Pulmonary embolus (HCC)    Pneumonia due to COVID-19 virus    CKD stage 3a, GFR 45-59 ml/min (HCC)    WOLFGANG (acute kidney injury) (HCC)    Elevated troponin    Sepsis with acute renal failure without septic shock, due to unspecified organism, unspecified acute renal failure type (HCC)    Acute cystitis without hematuria  Resolved Problems:    * No resolved hospital problems. *    - I have discussed the initial assessment and plan with ED provider, patient is being admitted for sepsis, COVID-19 pneumonia, WOLFGANG on CKD stage III, elevated troponin, and with history of pulmonary embolism taking warfarin    Sepsis with COVID-19 pneumonia  UTI  -Patient met sepsis  criteria with fever, tachypnea, leukocytosis and tachycardia, oxygen saturation has been normal on room air, blood pressure stable  -U/A and renal US suggests UTI  -S/p IV bolus in the ED  -Will continue LR at 75 cc/h  -Continue IV ceftriaxone and doxycycline  -Add dexamethasone 6 mg IV daily  -Trend lactic acid, check procalcitonin and daily CBC    WOLFGANG on CKD stage III  -Likely prerenal from dehydration and metabolic acidosis  -Continue IV hydration    Elevated troponin  -Likely secondary to above reasons, no ST/T wave change on EKG  -Check echo, trend troponin    History of pulm embolism  On long term warfarin  -INR 2.6 , D-dimer level 637  -Avoid CTA pulmonary at this moment due to WOLFGANG, will consider VQ scan if pt develops SOB or requires oxygen supplement  -Continue warfarin, will consult pharmacy dosing      DVT Prophylaxis: []Lovenox []Heparin []PCD [x] Warfarin/NOAC []Encouraged ambulation    Diet: No diet orders on file  Code Status: Prior  Surrogate decision maker confirmed with patient:   Extended Emergency Contact Information  Primary Emergency Contact: Brigid Gallego  Address: [address] [place] [address]            [place], [facility] [facility]  Home Phone: [phone]  Relation: Spouse  Secondary Emergency Contact: None,Per Pt  Relation: Other    Admit status: [] Observation [x] Inpatient   Disposition: []Med/Surg [x] Intermediate [] ICU/CCU    +++++++++++++++++++++++++++++++++++++++++++++++++  Nawaf Reis MD PhD  [facility]  +++++++++++++++++++++++++++++++++++++++++++++++++  NOTE: Report could be transcribed using voice recognition software. Every effort was made to ensure accuracy; however, inadvertent computerized transcription errors may be present.

## 2024-07-21 NOTE — ED NOTES
ED to Inpatient Handoff Report    Notified floor that electronic handoff available and patient ready for transport to room 644.    Safety Risks: Risk of falls    Patient in Restraints: no    Constant Observer or Patient : no    Telemetry Monitoring Ordered: Yes     Cardiac Rhythm: 1° AV Block    Order to transfer to unit without monitor: NO    Last MEWS: 1 Time completed: 0158    Deterioration Index: 26.85    Vitals:    07/20/24 2316 07/20/24 2321 07/20/24 2346 07/21/24 0158   BP: 105/72  103/73 119/62   Pulse: 80 82 81 64   Resp: 17 16 15 15   Temp:    97.4 °F (36.3 °C)   TempSrc:       SpO2:    98%   Weight:       Height:           Opportunity for questions and clarification was provided.

## 2024-07-22 ENCOUNTER — APPOINTMENT (OUTPATIENT)
Dept: CT IMAGING | Age: 74
End: 2024-07-22
Payer: MEDICARE

## 2024-07-22 PROBLEM — I50.9 ACUTE DECOMPENSATED HEART FAILURE (HCC): Status: ACTIVE | Noted: 2024-07-22

## 2024-07-22 PROBLEM — R00.0 TACHYCARDIA: Status: ACTIVE | Noted: 2024-07-22

## 2024-07-22 PROBLEM — J96.01 ACUTE HYPOXEMIC RESPIRATORY FAILURE (HCC): Status: ACTIVE | Noted: 2024-07-22

## 2024-07-22 PROBLEM — I16.0 HYPERTENSIVE URGENCY: Status: ACTIVE | Noted: 2024-07-22

## 2024-07-22 LAB
ALBUMIN SERPL-MCNC: 3.1 G/DL (ref 3.5–5.2)
ALP SERPL-CCNC: 86 U/L (ref 40–129)
ALT SERPL-CCNC: 94 U/L (ref 0–40)
ANION GAP SERPL CALCULATED.3IONS-SCNC: 10 MMOL/L (ref 7–16)
ANION GAP SERPL CALCULATED.3IONS-SCNC: 11 MMOL/L (ref 7–16)
ANION GAP SERPL CALCULATED.3IONS-SCNC: 14 MMOL/L (ref 7–16)
AST SERPL-CCNC: 51 U/L (ref 0–39)
BILIRUB SERPL-MCNC: 0.2 MG/DL (ref 0–1.2)
BUN SERPL-MCNC: 30 MG/DL (ref 6–23)
BUN SERPL-MCNC: 32 MG/DL (ref 6–23)
BUN SERPL-MCNC: 33 MG/DL (ref 6–23)
CALCIUM SERPL-MCNC: 7.6 MG/DL (ref 8.6–10.2)
CALCIUM SERPL-MCNC: 7.7 MG/DL (ref 8.6–10.2)
CALCIUM SERPL-MCNC: 7.7 MG/DL (ref 8.6–10.2)
CHLORIDE SERPL-SCNC: 101 MMOL/L (ref 98–107)
CO2 SERPL-SCNC: 22 MMOL/L (ref 22–29)
CO2 SERPL-SCNC: 22 MMOL/L (ref 22–29)
CO2 SERPL-SCNC: 23 MMOL/L (ref 22–29)
CREAT SERPL-MCNC: 1.7 MG/DL (ref 0.7–1.2)
CREAT SERPL-MCNC: 1.8 MG/DL (ref 0.7–1.2)
CREAT SERPL-MCNC: 2 MG/DL (ref 0.7–1.2)
EKG ATRIAL RATE: 103 BPM
EKG P AXIS: 94 DEGREES
EKG P-R INTERVAL: 212 MS
EKG Q-T INTERVAL: 346 MS
EKG QRS DURATION: 110 MS
EKG QTC CALCULATION (BAZETT): 453 MS
EKG R AXIS: -70 DEGREES
EKG T AXIS: 74 DEGREES
EKG VENTRICULAR RATE: 103 BPM
GFR, ESTIMATED: 35 ML/MIN/1.73M2
GFR, ESTIMATED: 40 ML/MIN/1.73M2
GFR, ESTIMATED: 40 ML/MIN/1.73M2
GLUCOSE SERPL-MCNC: 121 MG/DL (ref 74–99)
GLUCOSE SERPL-MCNC: 145 MG/DL (ref 74–99)
GLUCOSE SERPL-MCNC: 209 MG/DL (ref 74–99)
INR PPP: 6
LACTATE BLDV-SCNC: 2 MMOL/L (ref 0.5–2.2)
LACTATE BLDV-SCNC: 2.4 MMOL/L (ref 0.5–2.2)
LACTATE BLDV-SCNC: 2.8 MMOL/L (ref 0.5–2.2)
MAGNESIUM SERPL-MCNC: 1.4 MG/DL (ref 1.6–2.6)
PHOSPHATE SERPL-MCNC: 3.4 MG/DL (ref 2.5–4.5)
POTASSIUM SERPL-SCNC: 3.5 MMOL/L (ref 3.5–5)
POTASSIUM SERPL-SCNC: 4.4 MMOL/L (ref 3.5–5)
POTASSIUM SERPL-SCNC: 4.9 MMOL/L (ref 3.5–5)
PROT SERPL-MCNC: 6.1 G/DL (ref 6.4–8.3)
PROTHROMBIN TIME: 66.1 SEC (ref 9.3–12.4)
SODIUM SERPL-SCNC: 134 MMOL/L (ref 132–146)
SODIUM SERPL-SCNC: 134 MMOL/L (ref 132–146)
SODIUM SERPL-SCNC: 137 MMOL/L (ref 132–146)
TROPONIN I SERPL HS-MCNC: 105 NG/L (ref 0–11)

## 2024-07-22 PROCEDURE — 2500000003 HC RX 250 WO HCPCS

## 2024-07-22 PROCEDURE — 99223 1ST HOSP IP/OBS HIGH 75: CPT | Performed by: INTERNAL MEDICINE

## 2024-07-22 PROCEDURE — 83605 ASSAY OF LACTIC ACID: CPT

## 2024-07-22 PROCEDURE — 6360000002 HC RX W HCPCS: Performed by: INTERNAL MEDICINE

## 2024-07-22 PROCEDURE — 83735 ASSAY OF MAGNESIUM: CPT

## 2024-07-22 PROCEDURE — 99232 SBSQ HOSP IP/OBS MODERATE 35: CPT | Performed by: STUDENT IN AN ORGANIZED HEALTH CARE EDUCATION/TRAINING PROGRAM

## 2024-07-22 PROCEDURE — 93010 ELECTROCARDIOGRAM REPORT: CPT | Performed by: INTERNAL MEDICINE

## 2024-07-22 PROCEDURE — 84100 ASSAY OF PHOSPHORUS: CPT

## 2024-07-22 PROCEDURE — 2580000003 HC RX 258: Performed by: STUDENT IN AN ORGANIZED HEALTH CARE EDUCATION/TRAINING PROGRAM

## 2024-07-22 PROCEDURE — 84484 ASSAY OF TROPONIN QUANT: CPT

## 2024-07-22 PROCEDURE — 2580000003 HC RX 258: Performed by: HOSPITALIST

## 2024-07-22 PROCEDURE — 80053 COMPREHEN METABOLIC PANEL: CPT

## 2024-07-22 PROCEDURE — 2580000003 HC RX 258: Performed by: INTERNAL MEDICINE

## 2024-07-22 PROCEDURE — 80048 BASIC METABOLIC PNL TOTAL CA: CPT

## 2024-07-22 PROCEDURE — 71250 CT THORAX DX C-: CPT

## 2024-07-22 PROCEDURE — 6360000002 HC RX W HCPCS: Performed by: STUDENT IN AN ORGANIZED HEALTH CARE EDUCATION/TRAINING PROGRAM

## 2024-07-22 PROCEDURE — 85610 PROTHROMBIN TIME: CPT

## 2024-07-22 PROCEDURE — 2700000000 HC OXYGEN THERAPY PER DAY

## 2024-07-22 PROCEDURE — 6370000000 HC RX 637 (ALT 250 FOR IP): Performed by: STUDENT IN AN ORGANIZED HEALTH CARE EDUCATION/TRAINING PROGRAM

## 2024-07-22 PROCEDURE — 2580000003 HC RX 258

## 2024-07-22 PROCEDURE — 6370000000 HC RX 637 (ALT 250 FOR IP)

## 2024-07-22 PROCEDURE — APPSS180 APP SPLIT SHARED TIME > 60 MINUTES: Performed by: NURSE PRACTITIONER

## 2024-07-22 PROCEDURE — 97161 PT EVAL LOW COMPLEX 20 MIN: CPT

## 2024-07-22 PROCEDURE — 6360000002 HC RX W HCPCS: Performed by: HOSPITALIST

## 2024-07-22 PROCEDURE — 2060000000 HC ICU INTERMEDIATE R&B

## 2024-07-22 RX ORDER — ASPIRIN 325 MG
325 TABLET ORAL ONCE
Status: COMPLETED | OUTPATIENT
Start: 2024-07-22 | End: 2024-07-22

## 2024-07-22 RX ORDER — MAGNESIUM SULFATE IN WATER 40 MG/ML
2000 INJECTION, SOLUTION INTRAVENOUS ONCE
Status: COMPLETED | OUTPATIENT
Start: 2024-07-22 | End: 2024-07-22

## 2024-07-22 RX ORDER — SODIUM CHLORIDE, SODIUM LACTATE, POTASSIUM CHLORIDE, CALCIUM CHLORIDE 600; 310; 30; 20 MG/100ML; MG/100ML; MG/100ML; MG/100ML
INJECTION, SOLUTION INTRAVENOUS CONTINUOUS
Status: DISCONTINUED | OUTPATIENT
Start: 2024-07-22 | End: 2024-07-22

## 2024-07-22 RX ORDER — PHYTONADIONE 5 MG/1
2.5 TABLET ORAL ONCE
Status: COMPLETED | OUTPATIENT
Start: 2024-07-22 | End: 2024-07-22

## 2024-07-22 RX ADMIN — DEXAMETHASONE SODIUM PHOSPHATE 6 MG: 10 INJECTION INTRAMUSCULAR; INTRAVENOUS at 10:06

## 2024-07-22 RX ADMIN — WATER 2000 MG: 1 INJECTION INTRAMUSCULAR; INTRAVENOUS; SUBCUTANEOUS at 22:35

## 2024-07-22 RX ADMIN — SODIUM BICARBONATE: 84 INJECTION, SOLUTION INTRAVENOUS at 07:42

## 2024-07-22 RX ADMIN — SODIUM CHLORIDE, PRESERVATIVE FREE 10 ML: 5 INJECTION INTRAVENOUS at 22:36

## 2024-07-22 RX ADMIN — PHYTONADIONE 2.5 MG: 5 TABLET ORAL at 18:56

## 2024-07-22 RX ADMIN — ASPIRIN 325 MG: 325 TABLET ORAL at 03:20

## 2024-07-22 RX ADMIN — SODIUM CHLORIDE, PRESERVATIVE FREE 10 ML: 5 INJECTION INTRAVENOUS at 10:06

## 2024-07-22 RX ADMIN — SODIUM CHLORIDE, POTASSIUM CHLORIDE, SODIUM LACTATE AND CALCIUM CHLORIDE: 600; 310; 30; 20 INJECTION, SOLUTION INTRAVENOUS at 10:03

## 2024-07-22 RX ADMIN — MAGNESIUM SULFATE HEPTAHYDRATE 2000 MG: 40 INJECTION, SOLUTION INTRAVENOUS at 10:03

## 2024-07-22 NOTE — PROGRESS NOTES
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     Weight:   Wt Readings from Last 1 Encounters:   07/20/24 88.5 kg (195 lb)       TSH:  No results found for: \"TSH\"    Hepatic Function Panel:                            Lab Results   Component Value Date/Time    ALKPHOS 86 07/22/2024 04:35 AM    ALT 94 07/22/2024 04:35 AM    AST 51 07/22/2024 04:35 AM    BILITOT 0.2 07/22/2024 04:35 AM       Current significant warfarin drug-drug interactions include: steroids (variable/dose dependent)    Recent Labs     07/21/24  0200 07/21/24  0600 07/21/24  2250   HGB 15.2 15.4 14.2    363 220       Date Warfarin Dose INR Heparin or LMWH Comment   7/21 5 mg 2.5 --    7/22 HOLD 4.1 --                           Assessment:  Patient is a 74 y.o. male on warfarin for History of  VTE/PE.  Patient's home warfarin dosing regimen is warfarin 5mg daily.   Goal INR 2 - 3  INR jumped to 4.1 yesterday according to labwork which appears confusing    Plan:  Warfarin on hold tonight  Daily PT/INR until the INR is stable within the therapeutic range  Pharmacist will follow and monitor/adjust dosing as necessary    Thank you for this consult,    Maria Luz Pretty, MaryD, BCPS 7/22/2024 12:20 PM   Ext: [phone]

## 2024-07-22 NOTE — PROGRESS NOTES
Occupational Therapy      Occupational Therapy referral received.   No acute OT needs at this time. OT order discontinued

## 2024-07-22 NOTE — PROGRESS NOTES
[facility] Hospitalist Progress Note    Admitting Date and Time: 7/20/2024  5:14 PM  Admit Dx: WOLFGANG (acute kidney injury) (HCC) [N17.9]  UTI (urinary tract infection), bacterial [N39.0, A49.9]  Sepsis, due to unspecified organism, unspecified whether acute organ dysfunction present (HCC) [A41.9]  Sepsis with acute renal failure without septic shock, due to unspecified organism, unspecified acute renal failure type (HCC) [A41.9, R65.20, N17.9]  Nausea and vomiting, unspecified vomiting type [R11.2]  COVID-19 [U07.1]    Subjective:  Patient is being followed for WOLFGANG (acute kidney injury) (HCC) [N17.9]  UTI (urinary tract infection), bacterial [N39.0, A49.9]  Sepsis, due to unspecified organism, unspecified whether acute organ dysfunction present (HCC) [A41.9]  Sepsis with acute renal failure without septic shock, due to unspecified organism, unspecified acute renal failure type (HCC) [A41.9, R65.20, N17.9]  Nausea and vomiting, unspecified vomiting type [R11.2]  COVID-19 [U07.1]   Pt wants to go home, patient and wife at bedside updated.   Per RN: No major concerns except RRT this am for unstable vitals.    ROS: denies fever, chills, cp, sob, n/v, HA unless stated above.      magnesium sulfate  2,000 mg IntraVENous Once    sodium chloride flush  5-40 mL IntraVENous 2 times per day    dexAMETHasone  6 mg IntraVENous Daily    warfarin placeholder: dosing by pharmacy   Other RX Placeholder    cefTRIAXone (ROCEPHIN) IV  2,000 mg IntraVENous Q24H     sodium chloride flush, 5-40 mL, PRN  sodium chloride, , PRN  acetaminophen, 650 mg, Q6H PRN   Or  acetaminophen, 650 mg, Q6H PRN  levalbuterol, 0.63 mg, Q8H PRN         Objective:    BP 95/67   Pulse 85   Temp 98.7 °F (37.1 °C) (Oral)   Resp 22   Ht 1.778 m (5' 10\")   Wt 88.5 kg (195 lb)   SpO2 97%   BMI 27.98 kg/m²     General Appearance: alert and oriented to person, place and time and in no acute distress  Skin: warm and dry  Head: normocephalic and  region containing only fat. 7. Moderate-sized supraumbilical hernia containing a few loops of small bowel with no sign of obstruction. 8. Diverticulosis of the colon with no sign of diverticulitis.     US RETROPERITONEAL COMPLETE    Result Date: 7/20/2024  EXAMINATION: RETROPERITONEAL ULTRASOUND OF THE KIDNEYS AND URINARY BLADDER 7/20/2024 COMPARISON: None HISTORY: ORDERING SYSTEM PROVIDED HISTORY: WOLFGANG, covid 19 TECHNOLOGIST PROVIDED HISTORY: Reason for exam:->WOLFGANG, covid 19 What reading provider will be dictating this exam?->CRC FINDINGS: Kidneys: The right kidney measures  10.6 cm in length and the left kidney measures 11.5 cm in length. Kidneys demonstrate normal cortical echogenicity.  No evidence of hydronephrosis or intrarenal stones. Bladder: Diffuse urinary bladder wall thickening..     1. Diffuse urinary bladder wall thickening. 2. Unremarkable ultrasound of the kidneys.     XR CHEST PORTABLE    Result Date: 7/20/2024  EXAMINATION: ONE XRAY VIEW OF THE CHEST 7/20/2024 5:34 pm COMPARISON: None. HISTORY: ORDERING SYSTEM PROVIDED HISTORY: cough and congestion TECHNOLOGIST PROVIDED HISTORY: Reason for exam:->cough and congestion FINDINGS: The lungs are without acute focal process.  Right pleural effusion.  No pneumothorax.  The cardiomediastinal silhouette is without acute process. The osseous structures are without acute process.     Right pleural effusion. No definitive evidence for pneumonia.       Assessment:    Principal Problem:    Sepsis with acute renal failure without septic shock (HCC)  Active Problems:    History of Pulmonary embolus (HCC)    COVID-19    CKD stage 3a, GFR 45-59 ml/min (HCC)    WOLFGANG (acute kidney injury) (HCC)    Elevated troponin    Sepsis with acute renal failure without septic shock, due to unspecified organism, unspecified acute renal failure type (HCC)    Acute cystitis without hematuria    Hypertensive urgency    Tachycardia  Resolved Problems:    * No resolved hospital problems.

## 2024-07-22 NOTE — CARE COORDINATION
Covid + on RA at this time, IV rocephin and decadron w/renal, cardiology and ID following. CM placed a call to pt to complete assessment w/role of CM explained. Pt resides w/spouse in a one story home w/one step to enter and is independent w/o the use of any assistive devices. He is established w/Dr. Freeman and uses either [facility] or [facility] in [place]. No home O2, cpap or nebulizer. Hx of HHC and SNF stay, but he does not recall the agencies. Pt denies any needs for CM at this time. Will follow.  VICTORIANO GarciaN, RN  [facility] Case Management  [phone]

## 2024-07-22 NOTE — PROGRESS NOTES
Physical Therapy  Facility/Department: [address] INTERMEDIATE  Physical Therapy Initial Assessment    Name: Paresh Gallego  : 1950  MRN: [id number]  Date of Service: 2024          Patient Diagnosis(es): The primary encounter diagnosis was COVID-19. Diagnoses of Sepsis, due to unspecified organism, unspecified whether acute organ dysfunction present (HCC), Nausea and vomiting, unspecified vomiting type, UTI (urinary tract infection), bacterial, WOLFGANG (acute kidney injury) (HCC), and Hypertensive urgency were also pertinent to this visit.  Past Medical History:  has a past medical history of Cancer (HCC), DVT (deep venous thrombosis) (HCC), and Pulmonary embolism (HCC).  Past Surgical History:  has a past surgical history that includes Bladder removal; Prostatectomy; ureter revision; and ECHO Compl W Dop Color Flow (2012).      Requires PT Follow-Up: No     Evaluating Therapist: Lilly Luna PT     Referring Provider:      Nawaf Reis MD       PT order : PT eval and treat     Room #: 644  DIAGNOSIS: The primary encounter diagnosis was COVID-19. Diagnoses of Sepsis, due to unspecified organism, unspecified whether acute organ dysfunction present (HCC), Nausea and vomiting, unspecified vomiting type, UTI (urinary tract infection), bacterial, WOLFGANG (acute kidney injury) (HCC), and Hypertensive urgency were also pertinent to this visit.  PRECAUTIONS: falls, droplet plus     Social:  Pt lives with  wife  in a  1  floor plan  1  step  to enter.  Prior to admission pt walked with  no AD      Initial Evaluation  Date:  2024    Was pt agreeable to Eval/treatment?  Yes    Does pt have pain?     Bed Mobility  Rolling: independent   Supine to sit:  independent   Sit to supine:  independent onto transport cart   Scooting:  independent    Transfers Sit to stand:  independent   Stand to sit:  independent   Stand pivot:  NT    Ambulation     35  feet with  no AD  with  independent        Stair negotiation: ascended

## 2024-07-22 NOTE — PROGRESS NOTES
Infectious Disease  Progress Note  [facility]    Chief Complaint: fever    Subjective:  he was sick last night. Had RRT for fever/tachycardic, hypoxia. Improved with supportive care.     Scheduled Meds:   sodium chloride flush  5-40 mL IntraVENous 2 times per day    dexAMETHasone  6 mg IntraVENous Daily    warfarin placeholder: dosing by pharmacy   Other RX Placeholder    cefTRIAXone (ROCEPHIN) IV  2,000 mg IntraVENous Q24H     Continuous Infusions:   sodium chloride       PRN Meds:sodium chloride flush, sodium chloride, acetaminophen **OR** acetaminophen, levalbuterol    Prior to Admission medications    Medication Sig Start Date End Date Taking? Authorizing Provider   loratadine (CLARITIN) 10 MG capsule Take 1 capsule by mouth daily   Yes Provider, MD Elena   warfarin (COUMADIN) 5 MG tablet Take 1 tablet by mouth    Provider, Historical, MD        ROS:  As mentioned in subjective, all other systems negative      /67   Pulse 68   Temp 97.8 °F (36.6 °C) (Oral)   Resp 16   Ht 1.778 m (5' 10\")   Wt 88.5 kg (195 lb)   SpO2 99%   BMI 27.98 kg/m²     Physical Exam  Const/Neuro- unchanged, no signs of acute distress, Alert  ENMT- Within Normal Limits, Normocephalic, mucous membranes pink/moist, No thrush  Neck: Neck supple  Heart- Regular, Rate, Rhythm- no murmur appreciated.  Lungs- clear to ascultation. Respirations even and nonlabored.  Abdomen- Soft, bowel sounds positive, non tender  Musculo/Extremities-  Equal and symmetrical, no edema. No tenderness.  Neurological- No focal motor or sensory loss.  No confusion  Skin:  Warm and dry, free from rashes.    Lines: PIV    Labs, Cultures reviewed  Radiology and other consultants notes reviewed    Microbiology:  Blood cx 2 out of 2: GNR. BCID showed Klebsiella pneumoniae  Urine cx: GNR    Assessment:  Klebsiella pneumonia bacteremia:  Complicated UTI:  Hx of bladder cancer s/p reconstruction /r/o urinary retention:   SARS-CoV-2 infection. No pneumonia. Doing

## 2024-07-22 NOTE — PROGRESS NOTES
Nephrology Progress Note  [facility]    CC:  WOLFGANG    HPI from the 7/21/24 note:   pt is a 75 yo male with a pmh of bladder ca with intermal  bladder reconstruction at [facility] about 10 yrs ago, DVT/PE on oac, ckd 3a, who presented to the Er on 7/20 with fatigue, weakness, and dry cough. He was started on doxy and rocephin and LR at 75. GERALD showed no hydro and diffuse urinary bladder wall thickening. Cxr showed small R pleural eff. Resp panel shows covid 19 and he is on RA, he received a ns bolus in the ER. Labs show na 141, k 4.1, co2 21, bun 29, cr 1.9, ca 8.3, uric acid 5.1, procal 12.4, alb 3.7, alt 48, ast 35, wbc 13>24, hgb 15.2, plt 256, ck 61, d dimer 637. Ua shows sm LE, 6-9 wbc, 1+ bacteria, tr hgb. Vitals show emp 98.4, rr 18, hr 78, bp 128/79. Pt is on RA with 97% sat. Last cr is from 7/12/13 and was 1.3. pt was given ibuprofen.     He was taking nsaids at home for 4-5 days, not eating well and having fever and chills. Denies diarrhea, he threw up today. No hematuria, wears a diaper. No cp, headache, rash.     INTERVAL HX:    7/22/24: Pt awake alert and appropriate. He states last PM when the fever elevated and the lactic acid was up he felt poorly, but today he is feeling much better    PMH:    Past Medical History:   Diagnosis Date    Cancer (HCC)     bladder    DVT (deep venous thrombosis) (HCC)     Pulmonary embolism (HCC)        Patient Active Problem List   Diagnosis    History of Pulmonary embolus (HCC)    Bladder cancer (HCC)    COVID-19    CKD stage 3a, GFR 45-59 ml/min (HCC)    WOLFGANG (acute kidney injury) (HCC)    Elevated troponin    Sepsis with acute renal failure without septic shock (HCC)    Sepsis with acute renal failure without septic shock, due to unspecified organism, unspecified acute renal failure type (HCC)    Acute cystitis without hematuria    Hypertensive urgency    Tachycardia       Meds:     sodium chloride flush  5-40 mL IntraVENous 2 times per day    warfarin placeholder: dosing

## 2024-07-22 NOTE — PROGRESS NOTES
Patient:   STEFFEN ELLSWORTH            MRN: [facility]-[id number]            FIN: [id number]              Age:   36 years     Sex:  FEMALE     :  [birth year]   Associated Diagnoses:   None   Author:   JUSTINE COLÓN     History of Present Illness   Reason for Consultation:  acute kidney injury  Consult requested by Dr Vega  S:  Pt seen and examined.  She remains intubated, but is off sedation, awake and responsive.  She remains anuric.  She is afebrile and her blood pressure is stable.  She is to undergo tracheostomy placement today.  Allergies (1) Active Reaction  No Known Medication Allergies None Documented  Medications (17) Active  Scheduled: (9)  Albuterol-ipratropium 2.5-0.5 mg/3 mL nebulizer soln  3 mL, Nebulizer, Q4H  AmLODIPine 10 mg tab  10 mg 1 tab, OG-tube, Daily  Calcium acetate 667 mg cap [Ca 169 mg]  1,334 mg 2 cap, NG-tube, Q8H  Chlorhexidine gluconate 0.12% ORAL RINSE 15 mL repack  15 mL, Oral Mucosa, Q12H  insulin glargine  30 unit 0.3 mL, Subcutaneous, Q Bedtime  Insulin human lispro 1 unit/0.01 mL inj  3-18, Subcutaneous, Q6H  Labetalol 200 mg tab  200 mg 1 tab, OG-tube, Q12H  Pantoprazole 40 mg/20 mL (2 mg/mL) oral susp repack  40 mg 20 mL, OG-tube, Daily  Scopolamine (delivers 1 mg) 3-day ER patch  1 patch, Topical, Q72H  Continuous: (0)  PRN: (8)  Acetaminophen 650 mg/20.3 mL oral liquid UD  650 mg 20.3 mL, OG-tube, Q6H  Dextrose (glucose) 50% 25 gm/50 mL syringe  12.5 gm 25 mL, IV Push, As Directed PRN  HydrALAZINE 20 mg/1 mL inj SDV  20 mg 1 mL, Slow IV Push, Q6H  Hydrocodone-acetaminophen 7.5-325 mg/15 mL oral soln UD  5 mg 10 mL, Oral, Q4H  Labetalol 100 mg/20 mL inj MDV  20 mg 4 mL, Slow IV Push, Q6H  Polyvinyl-povidone tears 1.4%-0.6% tears PF ophth soln 0.4 mL -   2 drop, Each Eye, Q2H  Scopolamine (delivers 1 mg) 3-day ER patch  1 patch, Topical, Q72H  Senna 8.8 mg/5 mL oral syrup repack  8.8 mg 5 mL, OG-tube, Daily        Physical Examination   VS/Measurements     Vitals between:  Messaged Dr. Galloway regarding RRT, fluid orders, bmp results with bmp being hemolyzed, and lactic results.      15-DEC-2019 07:53:14   TO   16-DEC-2019 07:53:14                   LAST RESULT MINIMUM MAXIMUM  Temperature 37.4 37.2 37.4  Heart Rate 89 79 105  Respiratory Rate 24 16 32  NISBP           167 100 167  NIDBP           102 58 102  NIMBP           121 72 121  A Line Systolic 142 99 179  A Line Diastolic 75 49 79  A Line Mean 96 62 105  SpO2                    97 90 100  FiO2                    0.8 0.6 0.8    I & O between:  15-DEC-2019 07:53 TO 16-DEC-2019 07:53  Med Dosing Weight:  120.2  kg   06-DEC-2019  24 Hour Intake:   641.00  ( 5.33 mL/kg )  24 Hour Output:   2.00           24 Hour Urine/Stool Output:   0.0  24 Hour Balance:   639.00           24 Hour Urine Output:   2.00  ( 0.00 mL/kg/hr )  Gen:  Obese, intubated.  --+ ET tube in oropharynx; nasal passages with no buck blood.  CV:  RRR; distant sounds.  L:  Coarse breath sounds anteriorly.  aBd:  Obese, decreased bowel sounds  Ext:  No dependent edema  Neuro:  Sedated.  Psych:  uncooperative.  Access:  + rt IJ zana catheter (placed 11/27)     Review / Management   Laboratory results:     Labs between:  15-DEC-2019 07:53 to 16-DEC-2019 07:53  CBC:                 WBC  HgB  Hct  Plt  MCV  RDW   16-DEC-2019 10.6  (L) 8.1  (L) 28.0  345  78.4  (H) 18.8   DIFF:                 Seg  Neutroph//ABS  Lymph//ABS  Mono//ABS  EOS/ABS  16-DEC-2019 NOT APPLICABLE  54 // 5.7 18 // 1.9 14 // (H) 1.5  13 // (H) 1.3  BMP:                 Na  Cl  BUN  Glu   16-DEC-2019 144  103  (H) 75  (H) 154                              K  CO2  Cr  Ca                              3.8  30  (H) 6.88  9.8   Other Chem:             Mg  Phos  Triglycerides  GGTP  DirectBili                           2.4  4.1         POC GLU:                 Latest Result  Latest Date  Minimum  Min Date  Maximum  Max Date                             (H) 142  16-DEC-2019 (H) 142  16-DEC-2019 (H) 124  15-DEC-2019  Blood Gas:            Ph  PCO2  PO2  BiCarb  BaseExcess   Arterial:  16-DEC-2019 7.42  (H) 47  (L)  58  (H) 30  (H) 5                              Ionized Ca  Na  K  HgB  Lactic Acid                              1.24  143  3.7  (L) 8.7  0.6                  .                        Chest xray (my read):  + ET Tube in place; + cardiomegaly; + perihilar fullness  Medical records were extensively reviewed.     Impression and Plan   A/P:  36-year-old AA female with a history of essential hypertension, admitted after suddently collapsing at work, found to have hypertensive emergency, left thaamic intracranial hemorrhage s/p external ventricular drain now acute kidney injury.  1)   Acute kidney injury:  at this time the pt is in profound acute kidney injury, anuric.  Her serum creatinine continues to rise off dialysis, indicating lack of clearance.  PLan on dialysis today, will attempt to remove 3-4 liters as tolerated  --her right IJ zana catheter was placed nearly three weeks ago.  WOuld proceed with permacath placement in the next few days.  --her high BUN is likely due to high protein content in tube feeds and is not a reflection of uremia.  Her pruitt catheter has been removed.  I have personally asked RN to perform straight cath every 24hrs in an effort to accurately measure urine output (bladder scans are unlikely to be accurate given her body habitus).  --I will continue to assess her daily for renal recovery and dialysis needs.  --strict i's/o's.    --would aim for a systolic blood pressure ~ 140 mmHg.  --continue aggressive treatment of all infections.  --avoid nephrotoxins if at all possible (IV dye/NSAIDs/aminoglycosides).  2)  Pulm:  Intubated; for tracheostomy today.  3) ID:  off antibiotics.

## 2024-07-22 NOTE — CONSULTS
Inpatient Cardiology Consultation      Reason for Consult: Elevated troponin    Consulting Physician: Dr. Peters    Requesting Physician: Gwen Almodovar    Date of Consultation: 7/22/2024    HISTORY OF PRESENT ILLNESS:   Paresh Gallego  is a 74 y.o.  male unknown to [facility].  Patient presented via EMS emergency department chief complaint of nausea and vomiting on 7/20/2024.  Patient reported had been ongoing for the past couple days with fevers as high as 102 degrees.  Patient reported generalized myalgias and malaise.  Patient also reported cough nonproductive.  COVID-19 positive  Lactic acid sepsis 2.1 Urinalysis with small amount of white blood cells and leukocyte esterase and patient given 2 g of Rocephin 100 mg of doxycycline with concern for pneumonia on chest x-ray as well.  Lactic acid initially 2.1 and is worsened to 2.2 after fluid bolus.  Troponin Stable x 2 with reassuring EKG.   7/21/2024 at 8:30 PM RRT called for hypoxia, respiratory distress and tachycardia    PMH: see below    ED 7/20/2024, 1713 via EMS with emesis and fever  Arrival vitals: T98.8, RR 19, P99, /72, SpO2 95% on room air.  Significant Labs:   Lab Results   Component Value Date     07/22/2024    K 3.5 07/22/2024     07/22/2024    CO2 22 07/22/2024    BUN 32 (H) 07/22/2024    CREATININE 2.0 (H) 07/22/2024    GLUCOSE 209 (H) 07/22/2024    CALCIUM 7.7 (L) 07/22/2024    BILITOT 0.2 07/22/2024    ALKPHOS 86 07/22/2024    AST 51 (H) 07/22/2024    ALT 94 (H) 07/22/2024    LABGLOM 35 (L) 07/22/2024       Lab Results   Component Value Date    WBC 12.2 (H) 07/21/2024    HGB 14.2 07/21/2024    HCT 43.3 07/21/2024    .2 (H) 07/21/2024     07/21/2024     Lab Results   Component Value Date    CKTOTAL 61 07/21/2024    CKMB 0.6 02/27/2012    TROPONINI 0.07 02/27/2012    TROPHS 105 (H) 07/22/2024     D-dimer 637  RAD:   US RETROPERITONEAL COMPLETE   Final Result   1. Diffuse urinary bladder wall  remaining scored segments are normal.                            Low dose: All scored segments are normal.                                         Peak dose: All scored segments are normal.                                                                                                                                                                                                                                                                                              MITRAL VALVE                                                                      There is mild (1+) mitral valve regurgitation.                                                                                                                      TRICUSPID VALVE                                                                   There is trivial tricuspid valve regurgitation.                                                                                                                     AORTIC VALVE                                                                      There is mild (1+) aortic valve regurgitation.                                                                                                                      DOBUTAMINESTRESS ECHO                                                             Stress protocol consisted of dobutamine up to 40 mcg/kg/min .                     Rest HR 59 bpm. Peak  bpm. (90 % MPHR)                                      Rest  mmHg/95 mmHg. Peak  mmHg/90 mmHg.                               Rest ECG showed normal.                                                                                                                                             CONCLUSIONS:                                                                      - The dobutamine stress echo was negative for ischemia at 90 % of MPHR.           - Left ventricular systolic function is normal. EF = 61 ± 5% (2D 4-ch.)

## 2024-07-22 NOTE — PROGRESS NOTES
[facility] Quality Flow/Interdisciplinary Rounds Progress Note        Quality Flow Rounds held on July 22, 2024    Disciplines Attending:  Bedside Nurse, , , and Nursing Unit Leadership    Paresh Gallego was admitted on 7/20/2024  5:14 PM    Anticipated Discharge Date:       Disposition:    Mina Score:  Mina Scale Score: 23    Readmission Risk              Risk of Unplanned Readmission:  16           Discussed patient goal for the day, patient clinical progression, and barriers to discharge.  The following Goal(s) of the Day/Commitment(s) have been identified:   discharge planning, PT/OT eval, IV fluids, COVID+,       Efren Juarez RN  July 22, 2024

## 2024-07-22 NOTE — SIGNIFICANT EVENT
RRT note  RRT was called on the patient around 8:30 PM on 7/21 as patient was hypoxic tachycardic with respiratory distress.    On examination-patient was lying in bed, shivering, not feeling well  Vitals-blood pressure 200/100, pulse 130-140, respiratory rate 16-20, saturating 98% on nonrebreather.  General examination-lying in bed, shivering not feeling well  CVS-tachycardic  Respiratory-clear to auscultation bilaterally no added sounds  Abdomen soft nontender nondistended bowel sounds present  Extremities no cyanosis clubbing edema-  Neuro-alert awake, speech normal    Labs were drawn including CBC CMP and troponin  ABGs were drawn  Chest x-ray was ordered  Morphine 2 mg was ordered, administered  Twelve-lead EKG was done-showed sinus tachycardia  As patient was febrile, Tylenol was given along with Zofran for accompanying nausea  Breathing treatment Xopenex was given.    ABG showed pH of 7.132 pCO2 40 pO2 139 and bicarb of 30, bicarb drip was ordered and started    As the treatment was provided patient's heart rate did slow down to 90s to 100 blood pressure trending down to 150/90    Chest x-ray-no acute pathology  Labs are elevated    Plan was made to repeat ABGs in 1 hour and RRT was called off.    More than 35 minutes of critical time was spent.

## 2024-07-22 NOTE — ACP (ADVANCE CARE PLANNING)
Advance Care Planning     Advance Care Planning Activator (Inpatient)  Conversation Note      Date of ACP Conversation: 7/22/2024     Conversation Conducted with: Patient with Decision Making Capacity    ACP Activator: Sandie Sotelo RN    Health Care Decision Maker:     Current Designated Health Care Decision Maker:   Brigid Gallego Spouse  Attach        Home Phone: [phone]: REL1          Click here to complete Healthcare Decision Makers including section of the Healthcare Decision Maker Relationship (ie \"Primary\")  Today we documented Decision Maker(s) consistent with Legal Next of Kin hierarchy.    Care Preferences    Ventilation:  \"If you were in your present state of health and suddenly became very ill and were unable to breathe on your own, what would your preference be about the use of a ventilator (breathing machine) if it were available to you?\"      Would the patient desire the use of ventilator (breathing machine)?: yes    \"If your health worsens and it becomes clear that your chance of recovery is unlikely, what would your preference be about the use of a ventilator (breathing machine) if it were available to you?\"     Would the patient desire the use of ventilator (breathing machine)?: Yes      Resuscitation  \"CPR works best to restart the heart when there is a sudden event, like a heart attack, in someone who is otherwise healthy. Unfortunately, CPR does not typically restart the heart for people who have serious health conditions or who are very sick.\"    \"In the event your heart stopped as a result of an underlying serious health condition, would you want attempts to be made to restart your heart (answer \"yes\" for attempt to resuscitate) or would you prefer a natural death (answer \"no\" for do not attempt to resuscitate)?\" yes       [] Yes   [x] No   Educated Patient / Decision Maker regarding differences between Advance Directives and portable DNR orders.    Length of ACP Conversation in

## 2024-07-23 ENCOUNTER — APPOINTMENT (OUTPATIENT)
Age: 74
End: 2024-07-23
Attending: INTERNAL MEDICINE
Payer: MEDICARE

## 2024-07-23 VITALS
OXYGEN SATURATION: 100 % | DIASTOLIC BLOOD PRESSURE: 78 MMHG | TEMPERATURE: 98.2 F | SYSTOLIC BLOOD PRESSURE: 134 MMHG | BODY MASS INDEX: 27.92 KG/M2 | RESPIRATION RATE: 16 BRPM | HEIGHT: 70 IN | HEART RATE: 58 BPM | WEIGHT: 195 LBS

## 2024-07-23 LAB
25(OH)D3 SERPL-MCNC: 42.4 NG/ML (ref 30–100)
ACB COMPLEX DNA BLD POS QL NAA+NON-PROBE: NOT DETECTED
ALBUMIN SERPL-MCNC: 3.2 G/DL (ref 3.5–5.2)
ALP SERPL-CCNC: 113 U/L (ref 40–129)
ALT SERPL-CCNC: 73 U/L (ref 0–40)
ANION GAP SERPL CALCULATED.3IONS-SCNC: 10 MMOL/L (ref 7–16)
ANION GAP SERPL CALCULATED.3IONS-SCNC: 8 MMOL/L (ref 7–16)
AST SERPL-CCNC: 28 U/L (ref 0–39)
B FRAGILIS DNA BLD POS QL NAA+NON-PROBE: NOT DETECTED
BASOPHILS # BLD: 0.03 K/UL (ref 0–0.2)
BASOPHILS NFR BLD: 0 % (ref 0–2)
BILIRUB SERPL-MCNC: 0.3 MG/DL (ref 0–1.2)
BIOFIRE TEST COMMENT: ABNORMAL
BLACTX-M ISLT/SPM QL: NOT DETECTED
BLAIMP ISLT/SPM QL: NOT DETECTED
BLAKPC ISLT/SPM QL: NOT DETECTED
BLAOXA-48-LIKE ISLT/SPM QL: NOT DETECTED
BLAVIM ISLT/SPM QL: NOT DETECTED
BUN SERPL-MCNC: 35 MG/DL (ref 6–23)
BUN SERPL-MCNC: 37 MG/DL (ref 6–23)
C ALBICANS DNA BLD POS QL NAA+NON-PROBE: NOT DETECTED
C AURIS DNA BLD POS QL NAA+NON-PROBE: NOT DETECTED
C GATTII+NEOFOR DNA BLD POS QL NAA+N-PRB: NOT DETECTED
C GLABRATA DNA BLD POS QL NAA+NON-PROBE: NOT DETECTED
C KRUSEI DNA BLD POS QL NAA+NON-PROBE: NOT DETECTED
C PARAP DNA BLD POS QL NAA+NON-PROBE: NOT DETECTED
C TROPICLS DNA BLD POS QL NAA+NON-PROBE: NOT DETECTED
CA-I BLD-SCNC: 1.17 MMOL/L (ref 1.15–1.33)
CALCIUM SERPL-MCNC: 8.2 MG/DL (ref 8.6–10.2)
CALCIUM SERPL-MCNC: 8.5 MG/DL (ref 8.6–10.2)
CHLORIDE SERPL-SCNC: 102 MMOL/L (ref 98–107)
CHLORIDE SERPL-SCNC: 104 MMOL/L (ref 98–107)
CO2 SERPL-SCNC: 23 MMOL/L (ref 22–29)
CO2 SERPL-SCNC: 26 MMOL/L (ref 22–29)
COLISTIN RES MCR-1 ISLT/SPM QL: NOT DETECTED
CREAT SERPL-MCNC: 1.7 MG/DL (ref 0.7–1.2)
CREAT SERPL-MCNC: 1.9 MG/DL (ref 0.7–1.2)
E CLOAC COMP DNA BLD POS NAA+NON-PROBE: NOT DETECTED
E COLI DNA BLD POS QL NAA+NON-PROBE: NOT DETECTED
E FAECALIS DNA BLD POS QL NAA+NON-PROBE: NOT DETECTED
E FAECIUM DNA BLD POS QL NAA+NON-PROBE: NOT DETECTED
EKG ATRIAL RATE: 108 BPM
EKG P AXIS: 45 DEGREES
EKG P-R INTERVAL: 186 MS
EKG Q-T INTERVAL: 336 MS
EKG QRS DURATION: 126 MS
EKG QTC CALCULATION (BAZETT): 450 MS
EKG R AXIS: -56 DEGREES
EKG T AXIS: 72 DEGREES
EKG VENTRICULAR RATE: 108 BPM
ENTEROBACTERALES DNA BLD POS NAA+N-PRB: DETECTED
EOSINOPHIL # BLD: 0.02 K/UL (ref 0.05–0.5)
EOSINOPHILS RELATIVE PERCENT: 0 % (ref 0–6)
ERYTHROCYTE [DISTWIDTH] IN BLOOD BY AUTOMATED COUNT: 13.7 % (ref 11.5–15)
GFR, ESTIMATED: 36 ML/MIN/1.73M2
GFR, ESTIMATED: 41 ML/MIN/1.73M2
GLUCOSE SERPL-MCNC: 133 MG/DL (ref 74–99)
GLUCOSE SERPL-MCNC: 145 MG/DL (ref 74–99)
GP B STREP DNA BLD POS QL NAA+NON-PROBE: NOT DETECTED
HAEM INFLU DNA BLD POS QL NAA+NON-PROBE: NOT DETECTED
HCT VFR BLD AUTO: 37.2 % (ref 37–54)
HGB BLD-MCNC: 12.2 G/DL (ref 12.5–16.5)
IMM GRANULOCYTES # BLD AUTO: 0.34 K/UL (ref 0–0.58)
IMM GRANULOCYTES NFR BLD: 2 % (ref 0–5)
INR PPP: 3.5
K OXYTOCA DNA BLD POS QL NAA+NON-PROBE: NOT DETECTED
KLEBSIELLA SP DNA BLD POS QL NAA+NON-PRB: DETECTED
KLEBSIELLA SP DNA BLD POS QL NAA+NON-PRB: NOT DETECTED
L MONOCYTOG DNA BLD POS QL NAA+NON-PROBE: NOT DETECTED
LACTATE BLDV-SCNC: 0.9 MMOL/L (ref 0.5–2.2)
LACTATE BLDV-SCNC: 1.5 MMOL/L (ref 0.5–2.2)
LYMPHOCYTES NFR BLD: 1 K/UL (ref 1.5–4)
LYMPHOCYTES RELATIVE PERCENT: 5 % (ref 20–42)
MAGNESIUM SERPL-MCNC: 2 MG/DL (ref 1.6–2.6)
MCH RBC QN AUTO: 32.2 PG (ref 26–35)
MCHC RBC AUTO-ENTMCNC: 32.8 G/DL (ref 32–34.5)
MCV RBC AUTO: 98.2 FL (ref 80–99.9)
MICROORGANISM SPEC CULT: ABNORMAL
MICROORGANISM/AGENT SPEC: ABNORMAL
MICROORGANISM/AGENT SPEC: ABNORMAL
MONOCYTES NFR BLD: 1.04 K/UL (ref 0.1–0.95)
MONOCYTES NFR BLD: 5 % (ref 2–12)
N MEN DNA BLD POS QL NAA+NON-PROBE: NOT DETECTED
NEUTROPHILS NFR BLD: 88 % (ref 43–80)
NEUTS SEG NFR BLD: 17.23 K/UL (ref 1.8–7.3)
P AERUGINOSA DNA BLD POS NAA+NON-PROBE: NOT DETECTED
PHOSPHATE SERPL-MCNC: 2.5 MG/DL (ref 2.5–4.5)
PLATELET # BLD AUTO: 222 K/UL (ref 130–450)
PMV BLD AUTO: 11.3 FL (ref 7–12)
POTASSIUM SERPL-SCNC: 4 MMOL/L (ref 3.5–5)
POTASSIUM SERPL-SCNC: 4.1 MMOL/L (ref 3.5–5)
PROCALCITONIN SERPL-MCNC: 64.42 NG/ML (ref 0–0.08)
PROT SERPL-MCNC: 6.6 G/DL (ref 6.4–8.3)
PROTEUS SP DNA BLD POS QL NAA+NON-PROBE: NOT DETECTED
PROTHROMBIN TIME: 37.7 SEC (ref 9.3–12.4)
PTH-INTACT SERPL-MCNC: 86.2 PG/ML (ref 15–65)
RBC # BLD AUTO: 3.79 M/UL (ref 3.8–5.8)
RESISTANT GENE NDM BY PCR: NOT DETECTED
S AUREUS DNA BLD POS QL NAA+NON-PROBE: NOT DETECTED
S AUREUS+CONS DNA BLD POS NAA+NON-PROBE: NOT DETECTED
S EPIDERMIDIS DNA BLD POS QL NAA+NON-PRB: NOT DETECTED
S LUGDUNENSIS DNA BLD POS QL NAA+NON-PRB: NOT DETECTED
S MALTOPHILIA DNA BLD POS QL NAA+NON-PRB: NOT DETECTED
S MARCESCENS DNA BLD POS NAA+NON-PROBE: NOT DETECTED
S PNEUM DNA BLD POS QL NAA+NON-PROBE: NOT DETECTED
S PYO DNA BLD POS QL NAA+NON-PROBE: NOT DETECTED
SALMONELLA DNA BLD POS QL NAA+NON-PROBE: NOT DETECTED
SERVICE CMNT-IMP: ABNORMAL
SODIUM SERPL-SCNC: 136 MMOL/L (ref 132–146)
SODIUM SERPL-SCNC: 137 MMOL/L (ref 132–146)
SPECIMEN DESCRIPTION: ABNORMAL
STREPTOCOCCUS DNA BLD POS NAA+NON-PROBE: NOT DETECTED
TROPONIN I SERPL HS-MCNC: 37 NG/L (ref 0–11)
WBC OTHER # BLD: 19.7 K/UL (ref 4.5–11.5)

## 2024-07-23 PROCEDURE — 83735 ASSAY OF MAGNESIUM: CPT

## 2024-07-23 PROCEDURE — 36415 COLL VENOUS BLD VENIPUNCTURE: CPT

## 2024-07-23 PROCEDURE — 83605 ASSAY OF LACTIC ACID: CPT

## 2024-07-23 PROCEDURE — 82306 VITAMIN D 25 HYDROXY: CPT

## 2024-07-23 PROCEDURE — 83970 ASSAY OF PARATHORMONE: CPT

## 2024-07-23 PROCEDURE — 80053 COMPREHEN METABOLIC PANEL: CPT

## 2024-07-23 PROCEDURE — 99233 SBSQ HOSP IP/OBS HIGH 50: CPT | Performed by: INTERNAL MEDICINE

## 2024-07-23 PROCEDURE — 84145 PROCALCITONIN (PCT): CPT

## 2024-07-23 PROCEDURE — 82330 ASSAY OF CALCIUM: CPT

## 2024-07-23 PROCEDURE — 51798 US URINE CAPACITY MEASURE: CPT

## 2024-07-23 PROCEDURE — 84100 ASSAY OF PHOSPHORUS: CPT

## 2024-07-23 PROCEDURE — 84484 ASSAY OF TROPONIN QUANT: CPT

## 2024-07-23 PROCEDURE — 80048 BASIC METABOLIC PNL TOTAL CA: CPT

## 2024-07-23 PROCEDURE — 85025 COMPLETE CBC W/AUTO DIFF WBC: CPT

## 2024-07-23 PROCEDURE — 93010 ELECTROCARDIOGRAM REPORT: CPT | Performed by: INTERNAL MEDICINE

## 2024-07-23 PROCEDURE — 85610 PROTHROMBIN TIME: CPT

## 2024-07-23 PROCEDURE — 99239 HOSP IP/OBS DSCHRG MGMT >30: CPT | Performed by: STUDENT IN AN ORGANIZED HEALTH CARE EDUCATION/TRAINING PROGRAM

## 2024-07-23 RX ORDER — CEFDINIR 300 MG/1
300 CAPSULE ORAL 2 TIMES DAILY
Qty: 20 CAPSULE | Refills: 0 | Status: SHIPPED | OUTPATIENT
Start: 2024-07-23 | End: 2024-08-02

## 2024-07-23 RX ORDER — METOPROLOL SUCCINATE 25 MG/1
25 TABLET, EXTENDED RELEASE ORAL DAILY
Qty: 30 TABLET | Refills: 3 | Status: SHIPPED | OUTPATIENT
Start: 2024-07-23

## 2024-07-23 RX ORDER — TAMSULOSIN HYDROCHLORIDE 0.4 MG/1
0.4 CAPSULE ORAL DAILY
Qty: 30 CAPSULE | Refills: 3 | Status: SHIPPED | OUTPATIENT
Start: 2024-07-23

## 2024-07-23 RX ORDER — TAMSULOSIN HYDROCHLORIDE 0.4 MG/1
0.4 CAPSULE ORAL DAILY
Status: DISCONTINUED | OUTPATIENT
Start: 2024-07-23 | End: 2024-07-23 | Stop reason: HOSPADM

## 2024-07-23 NOTE — PROGRESS NOTES
Infectious Disease  Progress Note  [facility]    Chief Complaint: fever    Subjective:  he is doing much better. No fever. No SOB or cough or abdominal pain.     Scheduled Meds:   sodium chloride flush  5-40 mL IntraVENous 2 times per day    warfarin placeholder: dosing by pharmacy   Other RX Placeholder    cefTRIAXone (ROCEPHIN) IV  2,000 mg IntraVENous Q24H     Continuous Infusions:   sodium chloride       PRN Meds:sodium chloride flush, sodium chloride, acetaminophen **OR** acetaminophen, levalbuterol    Prior to Admission medications    Medication Sig Start Date End Date Taking? Authorizing Provider   metoprolol succinate (TOPROL XL) 25 MG extended release tablet Take 1 tablet by mouth daily 7/23/24  Yes Elmer Peters MD   loratadine (CLARITIN) 10 MG capsule Take 1 capsule by mouth daily   Yes ProviderElena MD   warfarin (COUMADIN) 5 MG tablet Take 1 tablet by mouth    ProviderElena MD        ROS:  As mentioned in subjective, all other systems negative      BP (!) 140/80   Pulse 73   Temp 98.6 °F (37 °C) (Oral)   Resp 16   Ht 1.778 m (5' 10\")   Wt 88.5 kg (195 lb)   SpO2 99%   BMI 27.98 kg/m²     Physical Exam  Const/Neuro- unchanged, no signs of acute distress, Alert  ENMT- Within Normal Limits, Normocephalic, mucous membranes pink/moist, No thrush  Neck: Neck supple  Heart- Regular, Rate, Rhythm- no murmur appreciated.  Lungs- clear to ascultation. Respirations even and nonlabored.  Abdomen- Soft, bowel sounds positive, non tender  Musculo/Extremities-  Equal and symmetrical, no edema. No tenderness.  Neurological- No focal motor or sensory loss.  No confusion  Skin:  Warm and dry, free from rashes.    Lines: PIV    Labs, Cultures reviewed  Radiology and other consultants notes reviewed    Microbiology:  Blood cx 2 out of 2:  Klebsiella pneumoniae- reviewed susceptibilities  Urine cx: Klebsiella pneumoniae    Assessment:  Klebsiella pneumonia bacteremia:  Complicated UTI:  Hx of bladder

## 2024-07-23 NOTE — PLAN OF CARE
Problem: Safety - Adult  Goal: Free from fall injury  7/23/2024 0959 by Denisse Clark RN  Outcome: Progressing  7/22/2024 2244 by Larisa Wallace RN  Outcome: Progressing     Problem: Respiratory - Adult  Goal: Achieves optimal ventilation and oxygenation  7/23/2024 0959 by Denisse Clark RN  Outcome: Progressing  7/22/2024 2244 by Larisa Wallace RN  Outcome: Progressing

## 2024-07-23 NOTE — PROGRESS NOTES
CLINICAL PHARMACY NOTE: MEDS TO BEDS    Total # of Prescriptions Filled: 3   The following medications were delivered to the patient:  Flomax 0.4 mg   Toprol xl 25 mg  Cefdinir 300 mg       Additional Documentation:

## 2024-07-23 NOTE — PLAN OF CARE
Problem: Safety - Adult  Goal: Free from fall injury  7/22/2024 2244 by Larisa Wallace, RN  Outcome: Progressing  7/22/2024 1327 by Pam Matias, RN  Outcome: Progressing     Problem: Respiratory - Adult  Goal: Achieves optimal ventilation and oxygenation  Outcome: Progressing

## 2024-07-23 NOTE — PROGRESS NOTES
[facility] Quality Flow/Interdisciplinary Rounds Progress Note        Quality Flow Rounds held on July 23, 2024    Disciplines Attending:  Bedside Nurse, , , and Nursing Unit Leadership    Paresh Gallego was admitted on 7/20/2024  5:14 PM    Anticipated Discharge Date:       Disposition:    Mina Score:  Mina Scale Score: 20    Readmission Risk              Risk of Unplanned Readmission:  17           Discussed patient goal for the day, patient clinical progression, and barriers to discharge.  The following Goal(s) of the Day/Commitment(s) have been identified:   discharge planning, CT chest, cardio plan, neprho, coumadin (vitamin K), monitor labs       Efren Juarez RN  July 23, 2024

## 2024-07-23 NOTE — PROGRESS NOTES
INPATIENT CARDIOLOGY FOLLOW-UP    Name: Paresh Gallego    Age: 74 y.o.    Date of Admission: 7/20/2024  5:14 PM    Date of Service: 7/23/2024    Primary Cardiologist: New to me    Chief Complaint: Follow-up for elevated troponin    Interim History:  No new overnight cardiac complaints. Currently with no complaints of CP, SOB, palpitations, dizziness, or lightheadedness. SR on telemetry.  Episodes of paroxysmal supraventricular tachycardia.  Frequent PACs.    Asymptomatic overall    Review of Systems:   Negative except as described above    Problem List:  Patient Active Problem List   Diagnosis    History of Pulmonary embolus (HCC)    Bladder cancer (HCC)    COVID-19    CKD stage 3a, GFR 45-59 ml/min (HCC)    WOLFGANG (acute kidney injury) (HCC)    Elevated troponin    Sepsis with acute renal failure without septic shock (HCC)    Sepsis with acute renal failure without septic shock, due to unspecified organism, unspecified acute renal failure type (HCC)    Acute cystitis without hematuria    Hypertensive urgency    Tachycardia    Acute hypoxemic respiratory failure (HCC)    Acute decompensated heart failure (HCC)       Current Medications:    Current Facility-Administered Medications:     sodium chloride flush 0.9 % injection 5-40 mL, 5-40 mL, IntraVENous, 2 times per day, Nawaf Reis MD, 10 mL at 07/22/24 2236    sodium chloride flush 0.9 % injection 5-40 mL, 5-40 mL, IntraVENous, PRN, Nawaf Reis MD    0.9 % sodium chloride infusion, , IntraVENous, PRN, Nawaf Reis MD    acetaminophen (TYLENOL) tablet 650 mg, 650 mg, Oral, Q6H PRN, 650 mg at 07/21/24 2040 **OR** acetaminophen (TYLENOL) suppository 650 mg, 650 mg, Rectal, Q6H PRN, Nawaf Reis MD    warfarin placeholder: dosing by pharmacy, , Other, RX Placeholder, Nawaf Reis MD    cefTRIAXone (ROCEPHIN) 2,000 mg in sterile water 20 mL IV syringe, 2,000 mg, IntraVENous, Q24H, Yesenia Mojica MD, 2,000 mg at 07/22/24 2235    levalbuterol (XOPENEX) nebulization 0.63

## 2024-07-23 NOTE — PROGRESS NOTES
[facility] Hospitalist Progress Note    Admitting Date and Time: 7/20/2024  5:14 PM  Admit Dx: WOLFGANG (acute kidney injury) (HCC) [N17.9]  UTI (urinary tract infection), bacterial [N39.0, A49.9]  Sepsis, due to unspecified organism, unspecified whether acute organ dysfunction present (HCC) [A41.9]  Sepsis with acute renal failure without septic shock, due to unspecified organism, unspecified acute renal failure type (HCC) [A41.9, R65.20, N17.9]  Nausea and vomiting, unspecified vomiting type [R11.2]  COVID-19 [U07.1]    Subjective:  Patient is being followed for WOLFGANG (acute kidney injury) (HCC) [N17.9]  UTI (urinary tract infection), bacterial [N39.0, A49.9]  Sepsis, due to unspecified organism, unspecified whether acute organ dysfunction present (HCC) [A41.9]  Sepsis with acute renal failure without septic shock, due to unspecified organism, unspecified acute renal failure type (HCC) [A41.9, R65.20, N17.9]  Nausea and vomiting, unspecified vomiting type [R11.2]  COVID-19 [U07.1]   Pt  feels \" the same\".   Per RN: No major concerns.    ROS: denies fever, chills, cp, sob, n/v, HA unless stated above.      sodium chloride flush  5-40 mL IntraVENous 2 times per day    warfarin placeholder: dosing by pharmacy   Other RX Placeholder    cefTRIAXone (ROCEPHIN) IV  2,000 mg IntraVENous Q24H     sodium chloride flush, 5-40 mL, PRN  sodium chloride, , PRN  acetaminophen, 650 mg, Q6H PRN   Or  acetaminophen, 650 mg, Q6H PRN  levalbuterol, 0.63 mg, Q8H PRN         Objective:    BP (!) 141/84   Pulse 71   Temp 98 °F (36.7 °C) (Oral)   Resp 18   Ht 1.778 m (5' 10\")   Wt 88.5 kg (195 lb)   SpO2 98%   BMI 27.98 kg/m²     General Appearance: alert and oriented to person, place and time and in no acute distress  Skin: warm and dry  Head: normocephalic and atraumatic  Eyes: pupils equal, round, and reactive to light, extraocular eye movements intact, conjunctivae normal  Neck: neck supple and non tender without mass  UTI  Gram-negative, Klebsiella bacteremia  -Patient met sepsis criteria with fever, tachypnea, leukocytosis and tachycardia, oxygen saturation has been normal on room air, blood pressure stable  -U/A , urine cx and renal US suggests UTI  - 1/2 blood culture turned positive for Enterobacterales and Klebsiella pneumoniae on 7/21/2024  -Lactic acidosis resolved,   procalcitonin 12.4  - S/p IV bolus in the ED,   continue LR at 75 cc/h  -Continue IV ceftriaxone s/p doxycycline  - weaned off oxygen, will stop dexamethasone 6 mg IV daily  -ID consulted     2. WOLFGANG on CKD stage III  -Likely prerenal from dehydration and metabolic acidosis  - s/p  IV hydration  -Nephrology consulted    3. Paroxysmal SVT -   started Toprol-XL 25 mg daily.  Elevated troponin  -Likely secondary to above reasons, no ST/T wave change on EKG, cardiology consulted  -Check echo,  troponin peak at 28, OP Ischemia workup. Recommend need for OP diuretics, once renal fxn stable.     4. History of pulm embolism  On long term warfarin  -INR 2.6 , D-dimer level 637  -Avoid CTA pulmonary at this moment due to WOLFGANG, will consider VQ scan if pt develops SOB or requires oxygen supplement  -Continue warfarin, consulted pharmacy for monitoring and dosing    5. RRT - 7/22 - patient was hypoxic tachycardic with respiratory distress.   ABGs were drawn  Chest x-ray was ordered  Morphine 2 mg was ordered, administered  Twelve-lead EKG was done-showed sinus tachycardia  As patient was febrile, Tylenol was given along with Zofran for accompanying nausea  Breathing treatment Xopenex was given.   ABG showed pH of 7.132 pCO2 40 pO2 139 and bicarb of 30, bicarb drip was ordered.    6. Hx of Urinary bladder ca s/p reconstruction.      7. RT LL mass - concern for cancer - Increased size of a nodular subpleural mass in the posterior right lower  lobe, now measuring 3.3 x 2.6 cm in diameter, previously 1.8 x 1.3 cm. Noted CT of the chest without contrast. Consulted pulmonology,

## 2024-07-23 NOTE — CONSULTS
Raimundo Granger M.D.,[facility]  Juan Francisco Singh D.O., LAUREN., [facility]  Timur Gil M.D.  Rebecca Moncada M.D.   Dave Rahman D.O.  Denys Breen M.D.       Patient:  Paresh Gallego 74 y.o. male MRN: [id number]           PULMONARY CONSULTATION    Reason for Consultation: lung mass, concern for malignancy   Referring Physician: Vanita Mckeon MD    Communication with the referring physician will be sent via the electronic medical record.    Chief Complaint: nausea and vomiting    CODE STATUS: FULL CODE    SUBJECTIVE:  HPI:  Paresh Gallego is a 74 y.o. male not previously known to our service we were asked to see for a concerning lung mass.    Paresh was admitted to the hospital on 7/20 where he tested positive for COVID. He had recently returned from a vacation in [place]. He is currently under the care of Infectious Disease for Klebsiella pneumonia bacteremia and complicated UTI and is receiving Ceftriaxone.    He has a chest ct that showed a RLL masslike density. Previous CT done in 2016 shows the mass however it was much smaller back then. Paresh tells me that it was addressed around that time at a [facility] location and had a PET/CT scan that did not light up. It was felt that the area in question could have potentially been some scar tissue from bladder surgery he had in 2012 for bladder cancer or rounded atelectasis. [facility] continued to follow the area in question and did eventually request for a CT guided biopsy to be done however the request was denied stating the nodule was too small.    Paresh is a former cigarette smoker and quit 30 years ago. He does have a history of blood clots and did develop post operative DVT and PE after his bladder surgery. He has been on Coumadin every since.    Paresh was seen today sitting up in the chair on room air. He is comfortable and not in any distress. He is hoping to be able to go home soon but does want to address the RLL nodular area. Lungs are clear on exam. He does deny  prn  Chronic coumadin, INR today 3.5.  OP PET/CT scan followed by appointment with Dr. Moncada.  Ok for discharge from pulmonary standpoint-message routed to testing and follow up      Thank you for allowing me to participate in the care of Paresh Gallego.   Please feel free to call with questions.     This plan of care was reviewed in collaboration with Dr. Moncada    Electronically signed by JAVIER Mathur CNP on 7/23/2024 at 8:12 AM      Note: This report was completed utilizing computer voice recognition software. Every effort has been made to ensure accuracy, however; inadvertent computerized transcription errors may be present    This is confirmation that I have personally performed a substantial portion of medical decision making (>50%) related to this patient encounter.  The medications & laboratory data and imagery were discussed and adjusted where necessary. Key issues of the case were discussed among consultants.  Review of CNP documentation was conducted and revisions were made as appropriate. I agree with the above documented exam, problem list and plan of care with the following additions:     We are asked to see Mr. Gallego for an abnormal CT of the chest which shows a R sided lung mass vs rounded atelectasis.  This area has progressed since 2016 at which time it was evaluated at [facility].  We will proceed with outpatient PET and office visit to determine if biopsy is needed and route of biopsy.    Rebecca Moncada MD

## 2024-07-23 NOTE — PROGRESS NOTES
SPIRITUAL HEALTH SERVICES - [facility] Encounter    Name: Paresh Gallego                  Referral: Routine Visit    Sacraments  Anointed (Last Rites): No  Apostolic [place]: No  Confession: No  Communion: No     Assessment:  Patient receptive to  visit.      Intervention:  None. Patient was being picked up by transport having been discharged.      Outcome:  Patient expressed gratitude for visit.    Plan:  None.      Electronically signed by Chaplain Leanne, on 7/23/2024 at 5:39 PM.  Spiritual Care Department  [facility]  [phone]

## 2024-07-23 NOTE — PROGRESS NOTES
Pharmacy Consultation Note  (Warfarin Dosing and Monitoring)    Initial consult date: 7/21/24  Consulting Provider: Dr. Chato NERI Lashonda is a 74 y.o. male for whom pharmacy has been asked to manage warfarin therapy.     Weight:   Wt Readings from Last 1 Encounters:   07/23/24 88.5 kg (195 lb)       TSH:  No results found for: \"TSH\"    Hepatic Function Panel:                            Lab Results   Component Value Date/Time    ALKPHOS 113 07/23/2024 04:35 AM    ALT 73 07/23/2024 04:35 AM    AST 28 07/23/2024 04:35 AM    BILITOT 0.3 07/23/2024 04:35 AM       Current significant warfarin drug-drug interactions include: steroids (variable/dose dependent)    Recent Labs     07/21/24  0600 07/21/24  2250 07/23/24  0620   HGB 15.4 14.2 12.2*    220 222       Date Warfarin Dose INR Heparin or LMWH Comment   7/21 5 mg 2.5 --    7/22 HOLD 4.1 -- Vit K 2.5mg PO x1   7/23 HOLD 3.5 --                    Assessment:  Patient is a 74 y.o. male on warfarin for History of  VTE/PE.  Patient's home warfarin dosing regimen is warfarin 5mg daily.   Goal INR 2 - 3  INR 3.5 today s/p vit K 2.5mg PO x1    Plan:  Warfarin on hold tonight  Daily PT/INR until the INR is stable within the therapeutic range  Pharmacist will follow and monitor/adjust dosing as necessary    Thank you for this consult,    Maria Luz Pretty, MaryD, BCPS 7/23/2024 2:46 PM   Ext: [phone]

## 2024-07-23 NOTE — DISCHARGE SUMMARY
[facility] Hospitalist Physician Discharge Summary       Rebecca Moncada MD  [address] [facility] [address]  [phone]    Follow up  follow up in office after PET scan completed      Activity level: As tolerated     Dispo: Home      Condition on discharge: Stable     Patient ID:  Paresh Gallego  [id number]  74 y.o.  1950    Admit date: 7/20/2024    Discharge date and time:  7/23/2024  3:01 PM    Admission Diagnoses: Principal Problem:    Sepsis with acute renal failure without septic shock (HCC)  Active Problems:    History of Pulmonary embolus (HCC)    COVID-19    CKD stage 3a, GFR 45-59 ml/min (HCC)    WOLFGANG (acute kidney injury) (HCC)    Elevated troponin    Sepsis with acute renal failure without septic shock, due to unspecified organism, unspecified acute renal failure type (HCC)    Acute cystitis without hematuria    Hypertensive urgency    Tachycardia    Acute hypoxemic respiratory failure (HCC)    Acute decompensated heart failure (HCC)  Resolved Problems:    * No resolved hospital problems. *      Discharge Diagnoses: Principal Problem:    Sepsis with acute renal failure without septic shock (HCC)  Active Problems:    History of Pulmonary embolus (HCC)    COVID-19    CKD stage 3a, GFR 45-59 ml/min (HCC)    WOLFGANG (acute kidney injury) (HCC)    Elevated troponin    Sepsis with acute renal failure without septic shock, due to unspecified organism, unspecified acute renal failure type (HCC)    Acute cystitis without hematuria    Hypertensive urgency    Tachycardia    Acute hypoxemic respiratory failure (HCC)    Acute decompensated heart failure (HCC)  Resolved Problems:    * No resolved hospital problems. *      Consults:  IP CONSULT TO CASE MANAGEMENT  IP CONSULT TO NEPHROLOGY  IP CONSULT TO PHARMACY  IP CONSULT TO INFECTIOUS DISEASES  IP CONSULT TO CARDIOLOGY  IP CONSULT TO PULMONOLOGY    Hospital Course:   Patient Paresh Gallego is a 74 y.o. presented with WOLFGANG (acute kidney injury)

## 2024-07-23 NOTE — PLAN OF CARE
Problem: Safety - Adult  Goal: Free from fall injury  7/23/2024 1538 by Denisse Clark RN  Outcome: Completed  7/23/2024 0959 by Denisse Clark RN  Outcome: Progressing     Problem: Respiratory - Adult  Goal: Achieves optimal ventilation and oxygenation  7/23/2024 1538 by Denisse Clark RN  Outcome: Completed  7/23/2024 0959 by Denisse Clark RN  Outcome: Progressing

## 2024-08-15 ENCOUNTER — HOSPITAL ENCOUNTER (OUTPATIENT)
Dept: PET IMAGING | Age: 74
Discharge: HOME | End: 2024-08-17
Attending: INTERNAL MEDICINE
Payer: MEDICARE

## 2024-08-15 DIAGNOSIS — R91.8 LUNG MASS: ICD-10-CM

## 2024-08-15 LAB — GLUCOSE BLD-MCNC: 82 MG/DL (ref 74–99)

## 2024-08-15 PROCEDURE — 3430000000 HC RX DIAGNOSTIC RADIOPHARMACEUTICAL: Performed by: RADIOLOGY

## 2024-08-15 PROCEDURE — A9609 HC RX DIAGNOSTIC RADIOPHARMACEUTICAL: HCPCS | Performed by: RADIOLOGY

## 2024-08-15 PROCEDURE — 82962 GLUCOSE BLOOD TEST: CPT

## 2024-08-15 PROCEDURE — 78815 PET IMAGE W/CT SKULL-THIGH: CPT

## 2024-08-15 RX ORDER — FLUDEOXYGLUCOSE F 18 200 MCI/ML
15 INJECTION, SOLUTION INTRAVENOUS
Status: COMPLETED | OUTPATIENT
Start: 2024-08-15 | End: 2024-08-15

## 2024-08-15 RX ADMIN — FLUDEOXYGLUCOSE F 18 15 MILLICURIE: 200 INJECTION, SOLUTION INTRAVENOUS at 11:07

## 2024-08-19 PROBLEM — R79.89 ELEVATED TROPONIN: Status: RESOLVED | Noted: 2024-07-20 | Resolved: 2024-08-19

## 2024-12-23 ENCOUNTER — HOSPITAL ENCOUNTER (OUTPATIENT)
Dept: CT IMAGING | Age: 74
Discharge: HOME | End: 2024-12-25
Payer: MEDICARE

## 2024-12-23 DIAGNOSIS — R91.8 LUNG MASS: ICD-10-CM

## 2024-12-23 PROCEDURE — 71250 CT THORAX DX C-: CPT

## 2024-12-25 ENCOUNTER — APPOINTMENT (OUTPATIENT)
Dept: CT IMAGING | Age: 74
DRG: 377 | End: 2024-12-25
Payer: MEDICARE

## 2024-12-25 ENCOUNTER — APPOINTMENT (OUTPATIENT)
Dept: GENERAL RADIOLOGY | Age: 74
DRG: 377 | End: 2024-12-25
Payer: MEDICARE

## 2024-12-25 ENCOUNTER — HOSPITAL ENCOUNTER (INPATIENT)
Age: 74
LOS: 1 days | Discharge: HOME | DRG: 377 | End: 2024-12-26
Attending: EMERGENCY MEDICINE | Admitting: INTERNAL MEDICINE
Payer: MEDICARE

## 2024-12-25 DIAGNOSIS — K92.2 GASTROINTESTINAL HEMORRHAGE, UNSPECIFIED GASTROINTESTINAL HEMORRHAGE TYPE: Primary | ICD-10-CM

## 2024-12-25 PROBLEM — K57.90 DIVERTICULOSIS: Status: ACTIVE | Noted: 2024-12-25

## 2024-12-25 PROBLEM — K62.5 RECTAL BLEEDING: Status: ACTIVE | Noted: 2024-12-25

## 2024-12-25 PROBLEM — R55 SYNCOPE: Status: ACTIVE | Noted: 2024-12-25

## 2024-12-25 LAB
ABO + RH BLD: NORMAL
ALBUMIN SERPL-MCNC: 3.8 G/DL (ref 3.5–5.2)
ALP SERPL-CCNC: 85 U/L (ref 40–129)
ALT SERPL-CCNC: 28 U/L (ref 0–40)
ANION GAP SERPL CALCULATED.3IONS-SCNC: 8 MMOL/L (ref 7–16)
ARM BAND NUMBER: NORMAL
AST SERPL-CCNC: 21 U/L (ref 0–39)
BASOPHILS # BLD: 0.07 K/UL (ref 0–0.2)
BASOPHILS NFR BLD: 1 % (ref 0–2)
BILIRUB SERPL-MCNC: 0.5 MG/DL (ref 0–1.2)
BLOOD BANK SAMPLE EXPIRATION: NORMAL
BLOOD GROUP ANTIBODIES SERPL: NEGATIVE
BUN SERPL-MCNC: 36 MG/DL (ref 6–23)
CALCIUM SERPL-MCNC: 8.9 MG/DL (ref 8.6–10.2)
CHLORIDE SERPL-SCNC: 104 MMOL/L (ref 98–107)
CO2 SERPL-SCNC: 27 MMOL/L (ref 22–29)
CREAT SERPL-MCNC: 1.9 MG/DL (ref 0.7–1.2)
EOSINOPHIL # BLD: 0.16 K/UL (ref 0.05–0.5)
EOSINOPHILS RELATIVE PERCENT: 1 % (ref 0–6)
ERYTHROCYTE [DISTWIDTH] IN BLOOD BY AUTOMATED COUNT: 14 % (ref 11.5–15)
GFR, ESTIMATED: 36 ML/MIN/1.73M2
GLUCOSE SERPL-MCNC: 143 MG/DL (ref 74–99)
HCT VFR BLD AUTO: 39.3 % (ref 37–54)
HCT VFR BLD AUTO: 40.4 % (ref 37–54)
HGB BLD-MCNC: 12.6 G/DL (ref 12.5–16.5)
HGB BLD-MCNC: 13 G/DL (ref 12.5–16.5)
IMM GRANULOCYTES # BLD AUTO: 0.1 K/UL (ref 0–0.58)
IMM GRANULOCYTES NFR BLD: 1 % (ref 0–5)
INR PPP: 3.3
LACTATE BLDV-SCNC: 1.6 MMOL/L (ref 0.5–2.2)
LACTATE BLDV-SCNC: 2.8 MMOL/L (ref 0.5–2.2)
LYMPHOCYTES NFR BLD: 1.65 K/UL (ref 1.5–4)
LYMPHOCYTES RELATIVE PERCENT: 12 % (ref 20–42)
MCH RBC QN AUTO: 32.2 PG (ref 26–35)
MCHC RBC AUTO-ENTMCNC: 32.2 G/DL (ref 32–34.5)
MCV RBC AUTO: 100 FL (ref 80–99.9)
MONOCYTES NFR BLD: 1.01 K/UL (ref 0.1–0.95)
MONOCYTES NFR BLD: 7 % (ref 2–12)
NEUTROPHILS NFR BLD: 79 % (ref 43–80)
NEUTS SEG NFR BLD: 11.01 K/UL (ref 1.8–7.3)
PLATELET # BLD AUTO: 294 K/UL (ref 130–450)
PMV BLD AUTO: 10.6 FL (ref 7–12)
POTASSIUM SERPL-SCNC: 4.3 MMOL/L (ref 3.5–5)
PROT SERPL-MCNC: 6.7 G/DL (ref 6.4–8.3)
PROTHROMBIN TIME: 36.2 SEC (ref 9.3–12.4)
RBC # BLD AUTO: 4.04 M/UL (ref 3.8–5.8)
SODIUM SERPL-SCNC: 139 MMOL/L (ref 132–146)
TROPONIN I SERPL HS-MCNC: 29 NG/L (ref 0–11)
TROPONIN I SERPL HS-MCNC: 31 NG/L (ref 0–11)
WBC OTHER # BLD: 14 K/UL (ref 4.5–11.5)

## 2024-12-25 PROCEDURE — 84484 ASSAY OF TROPONIN QUANT: CPT

## 2024-12-25 PROCEDURE — 6360000004 HC RX CONTRAST MEDICATION: Performed by: RADIOLOGY

## 2024-12-25 PROCEDURE — 83605 ASSAY OF LACTIC ACID: CPT

## 2024-12-25 PROCEDURE — 85018 HEMOGLOBIN: CPT

## 2024-12-25 PROCEDURE — 85014 HEMATOCRIT: CPT

## 2024-12-25 PROCEDURE — 86900 BLOOD TYPING SEROLOGIC ABO: CPT

## 2024-12-25 PROCEDURE — 86850 RBC ANTIBODY SCREEN: CPT

## 2024-12-25 PROCEDURE — 80053 COMPREHEN METABOLIC PANEL: CPT

## 2024-12-25 PROCEDURE — 74177 CT ABD & PELVIS W/CONTRAST: CPT

## 2024-12-25 PROCEDURE — 96360 HYDRATION IV INFUSION INIT: CPT

## 2024-12-25 PROCEDURE — 2580000003 HC RX 258

## 2024-12-25 PROCEDURE — 70450 CT HEAD/BRAIN W/O DYE: CPT

## 2024-12-25 PROCEDURE — 96361 HYDRATE IV INFUSION ADD-ON: CPT

## 2024-12-25 PROCEDURE — 71045 X-RAY EXAM CHEST 1 VIEW: CPT

## 2024-12-25 PROCEDURE — 1200000000 HC SEMI PRIVATE

## 2024-12-25 PROCEDURE — 99285 EMERGENCY DEPT VISIT HI MDM: CPT

## 2024-12-25 PROCEDURE — 72125 CT NECK SPINE W/O DYE: CPT

## 2024-12-25 PROCEDURE — G0378 HOSPITAL OBSERVATION PER HR: HCPCS

## 2024-12-25 PROCEDURE — 85025 COMPLETE CBC W/AUTO DIFF WBC: CPT

## 2024-12-25 PROCEDURE — 99223 1ST HOSP IP/OBS HIGH 75: CPT | Performed by: STUDENT IN AN ORGANIZED HEALTH CARE EDUCATION/TRAINING PROGRAM

## 2024-12-25 PROCEDURE — APPSS45 APP SPLIT SHARED TIME 31-45 MINUTES

## 2024-12-25 PROCEDURE — 93005 ELECTROCARDIOGRAM TRACING: CPT

## 2024-12-25 PROCEDURE — 85610 PROTHROMBIN TIME: CPT

## 2024-12-25 PROCEDURE — 86901 BLOOD TYPING SEROLOGIC RH(D): CPT

## 2024-12-25 RX ORDER — SODIUM CHLORIDE 9 MG/ML
INJECTION, SOLUTION INTRAVENOUS PRN
Status: DISCONTINUED | OUTPATIENT
Start: 2024-12-25 | End: 2024-12-26 | Stop reason: HOSPADM

## 2024-12-25 RX ORDER — ONDANSETRON 4 MG/1
4 TABLET, ORALLY DISINTEGRATING ORAL EVERY 8 HOURS PRN
Status: DISCONTINUED | OUTPATIENT
Start: 2024-12-25 | End: 2024-12-26 | Stop reason: HOSPADM

## 2024-12-25 RX ORDER — SODIUM CHLORIDE 0.9 % (FLUSH) 0.9 %
5-40 SYRINGE (ML) INJECTION PRN
Status: DISCONTINUED | OUTPATIENT
Start: 2024-12-25 | End: 2024-12-26 | Stop reason: HOSPADM

## 2024-12-25 RX ORDER — ACETAMINOPHEN 325 MG/1
650 TABLET ORAL EVERY 6 HOURS PRN
Status: DISCONTINUED | OUTPATIENT
Start: 2024-12-25 | End: 2024-12-26 | Stop reason: HOSPADM

## 2024-12-25 RX ORDER — ACETAMINOPHEN 650 MG/1
650 SUPPOSITORY RECTAL EVERY 6 HOURS PRN
Status: DISCONTINUED | OUTPATIENT
Start: 2024-12-25 | End: 2024-12-26 | Stop reason: HOSPADM

## 2024-12-25 RX ORDER — SODIUM CHLORIDE 0.9 % (FLUSH) 0.9 %
5-40 SYRINGE (ML) INJECTION EVERY 12 HOURS SCHEDULED
Status: DISCONTINUED | OUTPATIENT
Start: 2024-12-25 | End: 2024-12-26 | Stop reason: HOSPADM

## 2024-12-25 RX ORDER — CETIRIZINE HYDROCHLORIDE 10 MG/1
10 TABLET ORAL DAILY
Status: DISCONTINUED | OUTPATIENT
Start: 2024-12-25 | End: 2024-12-26 | Stop reason: HOSPADM

## 2024-12-25 RX ORDER — SODIUM CHLORIDE 9 MG/ML
INJECTION, SOLUTION INTRAVENOUS CONTINUOUS
Status: DISCONTINUED | OUTPATIENT
Start: 2024-12-25 | End: 2024-12-26 | Stop reason: HOSPADM

## 2024-12-25 RX ORDER — IOPAMIDOL 755 MG/ML
75 INJECTION, SOLUTION INTRAVASCULAR
Status: COMPLETED | OUTPATIENT
Start: 2024-12-25 | End: 2024-12-25

## 2024-12-25 RX ORDER — 0.9 % SODIUM CHLORIDE 0.9 %
1000 INTRAVENOUS SOLUTION INTRAVENOUS ONCE
Status: COMPLETED | OUTPATIENT
Start: 2024-12-25 | End: 2024-12-25

## 2024-12-25 RX ORDER — ONDANSETRON 2 MG/ML
4 INJECTION INTRAMUSCULAR; INTRAVENOUS EVERY 6 HOURS PRN
Status: DISCONTINUED | OUTPATIENT
Start: 2024-12-25 | End: 2024-12-26 | Stop reason: HOSPADM

## 2024-12-25 RX ADMIN — SODIUM CHLORIDE: 9 INJECTION, SOLUTION INTRAVENOUS at 20:48

## 2024-12-25 RX ADMIN — IOPAMIDOL 75 ML: 755 INJECTION, SOLUTION INTRAVENOUS at 16:55

## 2024-12-25 RX ADMIN — SODIUM CHLORIDE 1000 ML: 9 INJECTION, SOLUTION INTRAVENOUS at 16:37

## 2024-12-25 ASSESSMENT — LIFESTYLE VARIABLES
HOW OFTEN DO YOU HAVE A DRINK CONTAINING ALCOHOL: MONTHLY OR LESS
HOW OFTEN DO YOU HAVE A DRINK CONTAINING ALCOHOL: NEVER
HOW MANY STANDARD DRINKS CONTAINING ALCOHOL DO YOU HAVE ON A TYPICAL DAY: 1 OR 2
HOW MANY STANDARD DRINKS CONTAINING ALCOHOL DO YOU HAVE ON A TYPICAL DAY: PATIENT DOES NOT DRINK

## 2024-12-25 ASSESSMENT — PAIN SCALES - GENERAL
PAINLEVEL_OUTOF10: 0
PAINLEVEL_OUTOF10: 0

## 2024-12-25 ASSESSMENT — PAIN - FUNCTIONAL ASSESSMENT
PAIN_FUNCTIONAL_ASSESSMENT: NONE - DENIES PAIN
PAIN_FUNCTIONAL_ASSESSMENT: NONE - DENIES PAIN

## 2024-12-25 NOTE — ED PROVIDER NOTES
where there is a small hiatal hernia..  Cardiac size enlarged without pericardial effusion. Lungs/pleura: Lungs demonstrate mildly hyperinflated appearance with central cylindrical bronchiectasis.  Atelectasis in the right mid lung periphery and area of round opacification density in the right lower lung has the appearance of round atelectasis.  No separate nodule or mass.  Trace right pleural effusion. Upper Abdomen: Visualized portions of the upper abdomen unremarkable. Soft Tissues/Bones: No acute osseous or soft tissue findings. No aggressive osseous lesion.     1. Right lower lung opacification suggestive of round atelectasis.  No separate nodule 2. Trace right pleural effusion. 3. Cardiomegaly. 4. Small hiatal hernia. 5. Mild central cylindrical bronchiectasis. 6. No separate nodule or mass. 7. Recommend follow-up chest CT in 3 months to document stability of the right lower lung opacity. RECOMMENDATIONS: Lung-RADS 3 - Probably Benign (v2022) Management:  6 month LDCT     CT CHEST WO CONTRAST    Result Date: 12/23/2024  EXAMINATION: CT OF THE CHEST WITHOUT CONTRAST 12/23/2024 10:16 am TECHNIQUE: CT of the chest was performed without the administration of intravenous contrast. Multiplanar reformatted images are provided for review. Automated exposure control, iterative reconstruction, and/or weight based adjustment of the mA/kV was utilized to reduce the radiation dose to as low as reasonably achievable. COMPARISON: PET-CT dated 08/15/2024, CT chest dated 07/22/2024 HISTORY: ORDERING SYSTEM PROVIDED HISTORY: Lung mass TECHNOLOGIST PROVIDED HISTORY: Reason for exam:->Lung Mass Release to patient - Note: Delayed release will only apply to this order. Orders that are changed or resulted outside of the EHR will not respect a delayed release to MyChart.->Delay Immediate Release by 3 Days Reason for preventing immediate release->Likely risk of substantial harm FINDINGS: Mediastinum: Thyroid is homogeneous in  following medications:  Medications   sodium chloride flush 0.9 % injection 5-40 mL ( IntraVENous Not Given 12/25/24 2209)   sodium chloride flush 0.9 % injection 5-40 mL (has no administration in time range)   0.9 % sodium chloride infusion (has no administration in time range)   ondansetron (ZOFRAN-ODT) disintegrating tablet 4 mg (has no administration in time range)     Or   ondansetron (ZOFRAN) injection 4 mg (has no administration in time range)   acetaminophen (TYLENOL) tablet 650 mg (has no administration in time range)     Or   acetaminophen (TYLENOL) suppository 650 mg (has no administration in time range)   0.9 % sodium chloride infusion ( IntraVENous Rate/Dose Verify 12/25/24 2159)   cetirizine (ZYRTEC) tablet 10 mg (10 mg Oral Not Given 12/25/24 2036)   sodium chloride 0.9 % bolus 1,000 mL (0 mLs IntraVENous Stopped 12/25/24 1830)   iopamidol (ISOVUE-370) 76 % injection 75 mL (75 mLs IntraVENous Given 12/25/24 1655)             Medical Decision Making/Differential Diagnosis:    CC/HPI Summary, Social Determinants of health, Records Reviewed, DDx, testing done/not done, ED Course, Reassessment, disposition considerations/shared decision making with patient, consults, disposition:      ED Course as of 12/25/24 2304   Wed Dec 25, 2024   2256 EKG shows normal sinus rhythm at a rate of 84, normal MN interval, QRS prolonged to 108, QTc 475, T wave depression in aVL with no other ST or T wave abnormalities, does not meet STEMI criteria [KM]      ED Course User Index  [KM] Nicol Figueroa MD      He is a 74-year-old female with previous history of diverticulosis also on Coumadin for DVT and PE presenting for painless rectal bleeding starting this morning.  Patient also felt lightheaded and had a fall while in the bathroom.  He also endorses some shortness of breath.  Differentials include but not limited to diverticular bleed, anemia, ACS, intracranial hemorrhage, C-spine abnormality.  Patient's initial vitals

## 2024-12-26 VITALS
WEIGHT: 212.2 LBS | BODY MASS INDEX: 30.38 KG/M2 | HEART RATE: 92 BPM | HEIGHT: 70 IN | DIASTOLIC BLOOD PRESSURE: 68 MMHG | RESPIRATION RATE: 20 BRPM | TEMPERATURE: 97.9 F | OXYGEN SATURATION: 96 % | SYSTOLIC BLOOD PRESSURE: 125 MMHG

## 2024-12-26 LAB
ALBUMIN SERPL-MCNC: 3.3 G/DL (ref 3.5–5.2)
ALP SERPL-CCNC: 62 U/L (ref 40–129)
ALT SERPL-CCNC: 22 U/L (ref 0–40)
ANION GAP SERPL CALCULATED.3IONS-SCNC: 10 MMOL/L (ref 7–16)
AST SERPL-CCNC: 18 U/L (ref 0–39)
BASOPHILS # BLD: 0.02 K/UL (ref 0–0.2)
BASOPHILS NFR BLD: 0 % (ref 0–2)
BILIRUB SERPL-MCNC: 0.7 MG/DL (ref 0–1.2)
BUN SERPL-MCNC: 33 MG/DL (ref 6–23)
CALCIUM SERPL-MCNC: 8 MG/DL (ref 8.6–10.2)
CHLORIDE SERPL-SCNC: 106 MMOL/L (ref 98–107)
CO2 SERPL-SCNC: 23 MMOL/L (ref 22–29)
CREAT SERPL-MCNC: 1.8 MG/DL (ref 0.7–1.2)
EKG ATRIAL RATE: 84 BPM
EKG P AXIS: 46 DEGREES
EKG P-R INTERVAL: 198 MS
EKG Q-T INTERVAL: 402 MS
EKG QRS DURATION: 108 MS
EKG QTC CALCULATION (BAZETT): 475 MS
EKG R AXIS: -57 DEGREES
EKG T AXIS: 73 DEGREES
EKG VENTRICULAR RATE: 84 BPM
EOSINOPHIL # BLD: 0.12 K/UL (ref 0.05–0.5)
EOSINOPHILS RELATIVE PERCENT: 1 % (ref 0–6)
ERYTHROCYTE [DISTWIDTH] IN BLOOD BY AUTOMATED COUNT: 14.2 % (ref 11.5–15)
GFR, ESTIMATED: 39 ML/MIN/1.73M2
GLUCOSE SERPL-MCNC: 104 MG/DL (ref 74–99)
HCT VFR BLD AUTO: 31.3 % (ref 37–54)
HGB BLD-MCNC: 10.2 G/DL (ref 12.5–16.5)
IMM GRANULOCYTES # BLD AUTO: 0.07 K/UL (ref 0–0.58)
IMM GRANULOCYTES NFR BLD: 1 % (ref 0–5)
INR PPP: 2.1
LYMPHOCYTES NFR BLD: 1.31 K/UL (ref 1.5–4)
LYMPHOCYTES RELATIVE PERCENT: 9 % (ref 20–42)
MCH RBC QN AUTO: 32.4 PG (ref 26–35)
MCHC RBC AUTO-ENTMCNC: 32.6 G/DL (ref 32–34.5)
MCV RBC AUTO: 99.4 FL (ref 80–99.9)
MONOCYTES NFR BLD: 0.94 K/UL (ref 0.1–0.95)
MONOCYTES NFR BLD: 7 % (ref 2–12)
NEUTROPHILS NFR BLD: 83 % (ref 43–80)
NEUTS SEG NFR BLD: 12.08 K/UL (ref 1.8–7.3)
PLATELET # BLD AUTO: 236 K/UL (ref 130–450)
PMV BLD AUTO: 10.7 FL (ref 7–12)
POTASSIUM SERPL-SCNC: 4.2 MMOL/L (ref 3.5–5)
PROT SERPL-MCNC: 5.6 G/DL (ref 6.4–8.3)
PROTHROMBIN TIME: 23.2 SEC (ref 9.3–12.4)
RBC # BLD AUTO: 3.15 M/UL (ref 3.8–5.8)
SODIUM SERPL-SCNC: 139 MMOL/L (ref 132–146)
WBC OTHER # BLD: 14.5 K/UL (ref 4.5–11.5)

## 2024-12-26 PROCEDURE — 2580000003 HC RX 258

## 2024-12-26 PROCEDURE — G0378 HOSPITAL OBSERVATION PER HR: HCPCS

## 2024-12-26 PROCEDURE — 6370000000 HC RX 637 (ALT 250 FOR IP): Performed by: INTERNAL MEDICINE

## 2024-12-26 PROCEDURE — 85025 COMPLETE CBC W/AUTO DIFF WBC: CPT

## 2024-12-26 PROCEDURE — 80053 COMPREHEN METABOLIC PANEL: CPT

## 2024-12-26 PROCEDURE — 2500000003 HC RX 250 WO HCPCS

## 2024-12-26 PROCEDURE — 93010 ELECTROCARDIOGRAM REPORT: CPT | Performed by: INTERNAL MEDICINE

## 2024-12-26 PROCEDURE — 6370000000 HC RX 637 (ALT 250 FOR IP)

## 2024-12-26 PROCEDURE — 85610 PROTHROMBIN TIME: CPT

## 2024-12-26 PROCEDURE — 96361 HYDRATE IV INFUSION ADD-ON: CPT

## 2024-12-26 PROCEDURE — 99223 1ST HOSP IP/OBS HIGH 75: CPT | Performed by: SURGERY

## 2024-12-26 PROCEDURE — 94640 AIRWAY INHALATION TREATMENT: CPT

## 2024-12-26 PROCEDURE — 36415 COLL VENOUS BLD VENIPUNCTURE: CPT

## 2024-12-26 RX ORDER — PANTOPRAZOLE SODIUM 40 MG/1
40 TABLET, DELAYED RELEASE ORAL DAILY
Qty: 14 TABLET | Refills: 0 | Status: SHIPPED | OUTPATIENT
Start: 2024-12-26 | End: 2025-01-09

## 2024-12-26 RX ORDER — IPRATROPIUM BROMIDE AND ALBUTEROL SULFATE 2.5; .5 MG/3ML; MG/3ML
1 SOLUTION RESPIRATORY (INHALATION)
Status: DISCONTINUED | OUTPATIENT
Start: 2024-12-26 | End: 2024-12-26 | Stop reason: HOSPADM

## 2024-12-26 RX ORDER — PANTOPRAZOLE SODIUM 40 MG/1
40 TABLET, DELAYED RELEASE ORAL
Status: DISCONTINUED | OUTPATIENT
Start: 2024-12-26 | End: 2024-12-26 | Stop reason: HOSPADM

## 2024-12-26 RX ADMIN — IPRATROPIUM BROMIDE AND ALBUTEROL SULFATE 1 DOSE: .5; 2.5 SOLUTION RESPIRATORY (INHALATION) at 14:13

## 2024-12-26 RX ADMIN — SODIUM CHLORIDE, PRESERVATIVE FREE 10 ML: 5 INJECTION INTRAVENOUS at 08:59

## 2024-12-26 RX ADMIN — SODIUM CHLORIDE: 9 INJECTION, SOLUTION INTRAVENOUS at 10:00

## 2024-12-26 RX ADMIN — CETIRIZINE HYDROCHLORIDE 10 MG: 10 TABLET, FILM COATED ORAL at 08:29

## 2024-12-26 RX ADMIN — PANTOPRAZOLE SODIUM 40 MG: 40 TABLET, DELAYED RELEASE ORAL at 11:39

## 2024-12-26 ASSESSMENT — PAIN SCALES - GENERAL
PAINLEVEL_OUTOF10: 0
PAINLEVEL_OUTOF10: 0

## 2024-12-26 NOTE — ED NOTES
ED to Inpatient Handoff Report    Notified receiving RN on 5S that electronic handoff available and patient ready for transport to room 544.    Safety Risks: Risk of falls    Patient in Restraints: no    Constant Observer or Patient : no    Telemetry Monitoring Ordered: Yes          Order to transfer to unit without monitor: YES    Last MEWS: 1 Time completed: 2003    Deterioration Index: 22.95    Vitals:    12/25/24 1931 12/25/24 1932 12/25/24 1935 12/25/24 2003   BP:  (!) 140/83  106/71   Pulse: 92 99 94 98   Resp: 13 14 16 16   Temp:    97.6 °F (36.4 °C)   TempSrc:    Oral   SpO2: 100% 100% 99% 100%   Weight:       Height:           Opportunity for questions and clarification was provided.

## 2024-12-26 NOTE — PLAN OF CARE
Problem: Discharge Planning  Goal: Discharge to home or other facility with appropriate resources  Outcome: Progressing  Flowsheets (Taken 12/25/2024 2030)  Discharge to home or other facility with appropriate resources: Identify barriers to discharge with patient and caregiver

## 2024-12-26 NOTE — PLAN OF CARE
Problem: Discharge Planning  Goal: Discharge to home or other facility with appropriate resources  12/26/2024 1540 by Bob Aguilar RN  Outcome: Adequate for Discharge  12/26/2024 1447 by Bob Aguilar RN  Outcome: Progressing     Problem: Safety - Adult  Goal: Free from fall injury  12/26/2024 1540 by Bob Aguilar RN  Outcome: Adequate for Discharge  12/26/2024 1447 by Bob Aguilar RN  Outcome: Progressing

## 2024-12-26 NOTE — CARE COORDINATION
12/26/2024  Social Work Discharge Planning:GI bleed This worker met with Pt and his spouse to discuss  role and transition of care/discharge planning. Pt is independent from home with his spouse and is on room air.They reside in a one story home with no steps. Plan is for an out pt colonoscopy after discharge. Pharmacy is [facility] in [place] and PCP is Dr. RAGHU Freeman. Electronically signed by FITZ Bose on 12/26/2024 at 12:47 PM

## 2024-12-26 NOTE — CONSULTS
[facility] General Surgery Consult    Chief Complaint:  BRBPR    HPI:  Paresh Gallego is a 74 y.o. male with history of bladder cancer, DVT/PE on Coumadin who presented to the ER yesterday with bright red blood per rectum. Patient states that it started yesterday morning and had large amount of red blood with his bowel movement. It happened again in the early afternoon. He thinks that he also had a \"stomach bug\" because he had some nausea and vomiting followed by a syncopal episode and his wife found him and called EMS. On arrival, his HGB is normal. INR is supratherapeutic. His vital signs are stable. CT scan shows diverticulosis. He says that since admission, he has only had a scant amount of blood per rectum.    PMH:  Past Medical History:   Diagnosis Date    Acute decompensated heart failure (HCC) 7/22/2024    Cancer (HCC)     bladder    DVT (deep venous thrombosis) (HCC)     Pulmonary embolism (HCC)        PSH:  Past Surgical History:   Procedure Laterality Date    BLADDER REMOVAL      ECHO COMPL W DOP COLOR FLOW  2/29/2012         PROSTATECTOMY      URETER REVISION         MEDS:  Prior to Admission medications    Medication Sig Start Date End Date Taking? Authorizing Provider   Cholecalciferol (VITAMIN D3) 1.25 MG (21546 UT) CAPS 1 capsule once a week 6/24/24  Yes Provider, MD Elena   levocetirizine (XYZAL) 5 MG tablet Take 1 tablet by mouth every evening   Yes Provider, MD Elena   loratadine (CLARITIN) 10 MG capsule Take 1 capsule by mouth daily   Yes Provider, MD Elena   warfarin (COUMADIN) 5 MG tablet Take 1 tablet by mouth   Yes Provider, MD Elena   albuterol (PROVENTIL) (2.5 MG/3ML) 0.083% nebulizer solution Take 3 mLs by nebulization in the morning and at bedtime PLEASE BILL UNDER MEDICARE PART B 8/29/24 9/28/24  Anatoly Rahman APRN - CNS   metoprolol succinate (TOPROL XL) 25 MG extended release tablet Take 1 tablet by mouth daily  Patient not taking: Reported on 8/28/2024 7/23/24

## 2024-12-26 NOTE — H&P
[facility] Hospitalist Group History and Physical      CHIEF COMPLAINT:  Rectal bleeding    History of Present Illness:  This is a 74-year-old male with a past medical history of decompensated heart failure, bladder cancer s/p reconstruction, and PE who presents to the ED with rectal bleeding.  Patient is chronically anticoagulated with warfarin secondary to the history of blood clots.  He states he had 3 large, bloody bowel movements today.  After the third bowel movement, he states he felt lightheaded and lost consciousness.  He states he believes his wife caught him on the way down and he did not hit his head, but he is unsure.  When he awoke, he felt very nauseated and had several episodes of emesis, nonbloody.  States he has been feeling intermittently short of breath and fatigue since having COVID and 2 weeks ago was on antibiotics and a steroid taper for bronchitis.  Denies any history of GI bleeding.  Has had colonoscopy in the past, states many years ago, that he feels was normal.  Reports he has had diverticulitis in the past.  Denies abdominal pain, chest pain, short breath, or dysuria.  Rectal exam was done in ED, showing small nonbleeding external hemorrhoids and bright red gross blood.  Hemoglobin was 13, with repeat of 12.6.  INR elevated at 3.3.  CT A/P shows diverticulosis, no acute process.  Patient was treated with 1 L NS bolus and general surgery was consulted.  Decision to admit.    Informant(s) for H&P: Patient and chart review    REVIEW OF SYSTEMS:  A comprehensive review of systems was negative except for: what is in the HPI      PMH:  Past Medical History:   Diagnosis Date    Acute decompensated heart failure (HCC) 7/22/2024    Cancer (HCC)     bladder    DVT (deep venous thrombosis) (HCC)     Pulmonary embolism (HCC)        Surgical History:  Past Surgical History:   Procedure Laterality Date    BLADDER REMOVAL      ECHO COMPL W DOP COLOR FLOW  2/29/2012         PROSTATECTOMY       URETER REVISION         Medications Prior to Admission:    Prior to Admission medications    Medication Sig Start Date End Date Taking? Authorizing Provider   albuterol (PROVENTIL) (2.5 MG/3ML) 0.083% nebulizer solution Take 3 mLs by nebulization in the morning and at bedtime PLEASE BILL UNDER MEDICARE PART B 8/29/24 9/28/24  Anatoly Rahman APRN - CNS   Cholecalciferol (VITAMIN D3) 1.25 MG (67231 UT) CAPS 1 capsule once a week 6/24/24   Elena Guo MD   levocetirizine (XYZAL) 5 MG tablet Take 1 tablet by mouth every evening    Elena Guo MD   metoprolol succinate (TOPROL XL) 25 MG extended release tablet Take 1 tablet by mouth daily  Patient not taking: Reported on 8/28/2024 7/23/24   Elmer Peters MD   tamsulosin (FLOMAX) 0.4 MG capsule Take 1 capsule by mouth daily  Patient not taking: Reported on 8/28/2024 7/23/24   Vanita Mckeon MD   loratadine (CLARITIN) 10 MG capsule Take 1 capsule by mouth daily    Elena Guo MD   warfarin (COUMADIN) 5 MG tablet Take 1 tablet by mouth    ProviderElena MD       Allergies:    Patient has no known allergies.    Social History:    reports that he has never smoked. He does not have any smokeless tobacco history on file. He reports that he does not drink alcohol and does not use drugs.    Family History:   family history includes COPD in his father and mother.       PHYSICAL EXAM:  Vitals:  BP (!) 140/83   Pulse 94   Temp 97.5 °F (36.4 °C) (Oral)   Resp 16   Ht 1.778 m (5' 10\")   Wt 94.3 kg (208 lb)   SpO2 99%   BMI 29.84 kg/m²     General Appearance: alert and oriented to person, place and time and in no acute distress  Skin: warm and dry, mucous membranes dry  Head: normocephalic and atraumatic  Eyes: pupils equal, round, and reactive to light, conjunctivae normal  Pulmonary/Chest: clear to auscultation bilaterally- no wheezes, rales or rhonchi, normal air movement, no respiratory distress  Cardiovascular: normal rate, normal S1 and

## 2024-12-26 NOTE — DISCHARGE INSTR - COC
Continuity of Care Form    Patient Name: Paresh Gallego   :  1950  MRN:  [id number]    Admit date:  2024  Discharge date:  ***    Code Status Order: Full Code   Advance Directives:   Advance Care Flowsheet Documentation             Admitting Physician:  Evan Mckeon MD  PCP: Live Freeman MD    Discharging Nurse: ***  Discharging Hospital Unit/Room#: 0544/0544-A  Discharging Unit Phone Number: ***    Emergency Contact:   Extended Emergency Contact Information  Primary Emergency Contact: Brigid Gallego  Address: [address] [place] [address] [place] [address] [facility]  Home Phone: [phone]  Relation: Spouse  Secondary Emergency Contact: None,Per Pt  Relation: Other    Past Surgical History:  Past Surgical History:   Procedure Laterality Date    BLADDER REMOVAL      ECHO COMPL W DOP COLOR FLOW  2012         PROSTATECTOMY      URETER REVISION         Immunization History:   Immunization History   Administered Date(s) Administered    COVID-19, MODERNA Bivalent, (age 12y+), IM, 50 mcg/0.5 mL 2022    COVID-19, MODERNA, (age 12y+), IM, 50mcg/0.5mL 10/23/2023       Active Problems:  Patient Active Problem List   Diagnosis Code    History of Pulmonary embolus ([facility]) I26.99    Bladder cancer (HCC) C67.9    COVID-19 U07.1    CKD stage 3a, GFR 45-59 ml/min ([facility]) N18.31    WOLFGANG (acute kidney injury) ([facility]) N17.9    Sepsis with acute renal failure without septic shock ([facility]) A41.9, R65.20, N17.9    Sepsis with acute renal failure without septic shock, due to unspecified organism, unspecified acute renal failure type ([facility]) A41.9, R65.20, N17.9    Acute cystitis without hematuria N30.00    Hypertensive urgency I16.0    Tachycardia R00.0    Acute hypoxemic respiratory failure J96.01    Acute decompensated heart failure (HCC) I50.9    GI bleed K92.2    Rectal bleeding K62.5    Syncope R55    Diverticulosis K57.90       Isolation/Infection:   Isolation            No Isolation          Patient  Swallowing Test): {Done Not Done Date:095907064}    Treatments at the Time of Hospital Discharge:   Respiratory Treatments: ***  Oxygen Therapy:  {Therapy; copd oxygen:75389}  Ventilator:    { CC Vent List:485850813}    Rehab Therapies: {THERAPEUTIC INTERVENTION:5975910212}  Weight Bearing Status/Restrictions: {[facility] Weight Bearin}  Other Medical Equipment (for information only, NOT a DME order):  {EQUIPMENT:740754670}  Other Treatments: ***    Patient's personal belongings (please select all that are sent with patient):  {CHP DME Belongings:298786670}    RN SIGNATURE:  {Esignature:739872703}    CASE MANAGEMENT/SOCIAL WORK SECTION    Inpatient Status Date: ***    Readmission Risk Assessment Score:  [facility] RISK OF UNPLANNED READMISSION 2.0             14.4 Total Score        Discharging to Facility/ Agency   Name:   Address:  Phone:  Fax:    Dialysis Facility (if applicable)   Name:  Address:  Dialysis Schedule:  Phone:  Fax:    / signature: {Esignature:976029898}    PHYSICIAN SECTION    Prognosis: {Prognosis:2395019858}    Condition at Discharge: { Patient Condition:041533187}    Rehab Potential (if transferring to Rehab): {Prognosis:3967784048}    Recommended Labs or Other Treatments After Discharge: ***    Physician Certification: I certify the above information and transfer of Paresh Gallego  is necessary for the continuing treatment of the diagnosis listed and that he requires {Admit to Appropriate Level of Care:94927} for {GREATER/LESS:197276089} 30 days.     Update Admission H&P: {CHP DME Changes in HandP:871007736}    PHYSICIAN SIGNATURE:  {Esignature:515179354}

## 2024-12-26 NOTE — PROGRESS NOTES
4 Eyes Skin Assessment     NAME:  Paresh aGllego  YOB: 1950  MEDICAL RECORD NUMBER:  [id number]    The patient is being assessed for  Admission    I agree that at least one RN has performed a thorough Head to Toe Skin Assessment on the patient. ALL assessment sites listed below have been assessed.      Areas assessed by both nurses:    Head, Face, Ears, Shoulders, Back, Chest, Arms, Elbows, Hands, Sacrum. Buttock, Coccyx, Ischium, Legs. Feet and Heels, and Under Medical Devices         Does the Patient have a Wound? No noted wound(s)       Mina Prevention initiated by RN: No  Wound Care Orders initiated by RN: No    Pressure Injury (Stage 3,4, Unstageable, DTI, NWPT, and Complex wounds) if present, place Wound referral order by RN under : No    New Ostomies, if present place, Ostomy referral order under : No     Nurse 1 eSignature: Electronically signed by Felix Stephens on 12/25/24 at 8:37 PM EST    **SHARE this note so that the co-signing nurse can place an eSignature**    Nurse 2 eSignature: Electronically signed by Sudha Gamble RN on 12/25/24 at 9:22 PM EST

## 2024-12-26 NOTE — PROGRESS NOTES
Message sent to surgery regarding discharge plans. Per Dr Travis, he wont scope the patient unless there was active bleeding. Patient is tolerating a diet and has no complaints of blood loss. Message sent to primary to see about discharge

## 2024-12-27 NOTE — DISCHARGE SUMMARY
[facility] Hospitalist Physician Discharge Summary       Keanu Travis, DO  [address] [facility] [address]  [phone]    Call in 2 day(s)      Live Freeman MD  [address] [facility] [address] [facility] [address] [phone]    Call in 2 day(s)        Activity level: As tolerated     Dispo: Home    Condition on discharge: Stable     Patient ID:  Paresh Gallego  [id number]  74 y.o.  1950    Admit date: 12/25/2024    Discharge date and time:  12/26/2024  7:12 PM    Admission Diagnoses: Principal Problem:    GI bleed  Active Problems:    History of Pulmonary embolus (HCC)    Rectal bleeding    Syncope    Diverticulosis  Resolved Problems:    * No resolved hospital problems. *      Discharge Diagnoses: Principal Problem:    GI bleed  Active Problems:    History of Pulmonary embolus (HCC)    Rectal bleeding    Syncope    Diverticulosis  Resolved Problems:    * No resolved hospital problems. *      Consults:  IP CONSULT TO GENERAL SURGERY    Procedures:     Hospital Course:   Patient Paresh Gallego is a 74 y.o. presented with GI bleed [K92.2]    Patient was admitted for GI bleed and near syncope.  His hemoglobin admission was 13 with repeat being 12.6.  INR was 3.3.  Of note patient is currently taking warfarin.  CT scan showed diverticulosis.  He received fluid bolus and general surgery was consulted.  Patient's hemoglobin remained stable above 10.  No further bleeding was noted on admission.  Patient to follow-up as outpatient with general surgery.  He is being discharged in stable condition.    Discharge Exam:    General Appearance: alert and oriented to person, place and time and in no acute distress  Skin: warm and dry  Head: normocephalic and atraumatic  Eyes: pupils equal, round, and reactive to light, extraocular eye movements intact, conjunctivae normal  Neck: neck supple and non tender without mass   Pulmonary/Chest: clear to auscultation bilaterally- no wheezes, rales or rhonchi, normal  on 12/26/2024 at 7:12 PM

## 2025-01-01 NOTE — PROGRESS NOTES
Physician Progress Note      PATIENT:               LEIGHANN MANCINI  [facility] #:                  [id number]  :                       1950  ADMIT DATE:       2024 2:43 PM  DISCH DATE:        2024 3:42 PM  RESPONDING  PROVIDER #:        Tarun Dooley MD          QUERY TEXT:    Patient admitted with Diverticulosis with rectal bleeding, noted to have HB   13.0,12.6,10.2 . If possible, please document in progress notes and discharge   summary if you are evaluating and/or treating any of the following:    The medical record reflects the following:  Risk Factors: H/O Cancer,DVT  Clinical Indicators: ED -rectal bleeding starting this morning.  Patient   states he went to use the bathroom and had bright red blood per rectum not   necessarily with bowel movement.He has had no prior rectal bleeding similar in   the past.  H&P -Rectal bleeding: General Surgery consulted in ED.  Hold Coumadin,   INR supratherapeutic at 3.3.  HB Vlaues-13.0,12.6,10.2  Treatment: serial lab monitoring for Hemoglobin,hematocrit  Thank you  nigel Candelaria,cds  Options provided:  -- acute blood loss anemia  -- Precipitous drop in Hemoglobin and Hematocrit  -- hemoglobin values  not clinically significant  -- Other - I will add my own diagnosis  -- Disagree - Not applicable / Not valid  -- Disagree - Clinically unable to determine / Unknown  -- Refer to Clinical Documentation Reviewer    PROVIDER RESPONSE TEXT:    This patient has acute blood loss anemia    Query created by: Teagan Gibson on 2024 6:15 AM      QUERY TEXT:    Patient admitted with diverticulosis with, noted to have troponin values   29,31. If possible, please document in progress notes and discharge summary if   you are evaluating and/or treating any of the following:    The medical record reflects the following:  Risk Factors: h/o CHF,CKD  Clinical Indicators: H&P -CKD-creatinine 1.9, avoid nephrotoxins and

## 2025-01-29 ENCOUNTER — OFFICE VISIT (OUTPATIENT)
Dept: SURGERY | Age: 75
End: 2025-01-29
Payer: MEDICARE

## 2025-01-29 VITALS
RESPIRATION RATE: 18 BRPM | HEIGHT: 70 IN | SYSTOLIC BLOOD PRESSURE: 155 MMHG | BODY MASS INDEX: 29.49 KG/M2 | OXYGEN SATURATION: 97 % | WEIGHT: 206 LBS | DIASTOLIC BLOOD PRESSURE: 80 MMHG | HEART RATE: 74 BPM

## 2025-01-29 DIAGNOSIS — K62.5 BRBPR (BRIGHT RED BLOOD PER RECTUM): Primary | ICD-10-CM

## 2025-01-29 DIAGNOSIS — K57.30 DIVERTICULOSIS LARGE INTESTINE W/O PERFORATION OR ABSCESS W/O BLEEDING: ICD-10-CM

## 2025-01-29 DIAGNOSIS — Z86.0101 HISTORY OF ADENOMATOUS POLYP OF COLON: ICD-10-CM

## 2025-01-29 PROCEDURE — 1124F ACP DISCUSS-NO DSCNMKR DOCD: CPT | Performed by: SURGERY

## 2025-01-29 PROCEDURE — 3077F SYST BP >= 140 MM HG: CPT | Performed by: SURGERY

## 2025-01-29 PROCEDURE — G8419 CALC BMI OUT NRM PARAM NOF/U: HCPCS | Performed by: SURGERY

## 2025-01-29 PROCEDURE — 99213 OFFICE O/P EST LOW 20 MIN: CPT | Performed by: SURGERY

## 2025-01-29 PROCEDURE — 1036F TOBACCO NON-USER: CPT | Performed by: SURGERY

## 2025-01-29 PROCEDURE — 1159F MED LIST DOCD IN RCRD: CPT | Performed by: SURGERY

## 2025-01-29 PROCEDURE — 3079F DIAST BP 80-89 MM HG: CPT | Performed by: SURGERY

## 2025-01-29 PROCEDURE — G8427 DOCREV CUR MEDS BY ELIG CLIN: HCPCS | Performed by: SURGERY

## 2025-01-29 RX ORDER — SODIUM CHLORIDE 0.9 % (FLUSH) 0.9 %
5-40 SYRINGE (ML) INJECTION EVERY 12 HOURS SCHEDULED
OUTPATIENT
Start: 2025-01-29

## 2025-01-29 RX ORDER — SODIUM CHLORIDE 0.9 % (FLUSH) 0.9 %
5-40 SYRINGE (ML) INJECTION PRN
OUTPATIENT
Start: 2025-01-29

## 2025-01-29 RX ORDER — SODIUM CHLORIDE 9 MG/ML
INJECTION, SOLUTION INTRAVENOUS PRN
OUTPATIENT
Start: 2025-01-29

## 2025-01-29 NOTE — PROGRESS NOTES
Objective:  Vitals:    01/29/25 1309   BP: (!) 155/80   Pulse: 74   Resp: 18   SpO2: 97%   Weight: 93.4 kg (206 lb)   Height: 1.778 m (5' 10\")          Physical Exam  Vitals reviewed.   Constitutional:       General: He is not in acute distress.     Appearance: Normal appearance.   HENT:      Head: Normocephalic and atraumatic.   Eyes:      Extraocular Movements: Extraocular movements intact.      Pupils: Pupils are equal, round, and reactive to light.   Cardiovascular:      Rate and Rhythm: Normal rate and regular rhythm.      Pulses: Normal pulses.      Heart sounds: Normal heart sounds.   Pulmonary:      Effort: Pulmonary effort is normal.      Breath sounds: Normal breath sounds.   Abdominal:      General: Abdomen is flat. There is no distension.      Palpations: Abdomen is soft.      Tenderness: There is no abdominal tenderness.   Musculoskeletal:         General: Normal range of motion.      Cervical back: Normal range of motion.   Skin:     General: Skin is warm and dry.   Neurological:      General: No focal deficit present.      Mental Status: He is alert and oriented to person, place, and time.   Psychiatric:         Mood and Affect: Mood normal.         Behavior: Behavior normal.         CBC:   Lab Results   Component Value Date/Time    WBC 14.5 12/26/2024 07:35 AM    RBC 3.15 12/26/2024 07:35 AM    HGB 10.2 12/26/2024 07:35 AM    HCT 31.3 12/26/2024 07:35 AM    MCV 99.4 12/26/2024 07:35 AM    MCH 32.4 12/26/2024 07:35 AM    MCHC 32.6 12/26/2024 07:35 AM    RDW 14.2 12/26/2024 07:35 AM     12/26/2024 07:35 AM    MPV 10.7 12/26/2024 07:35 AM     CMP:    Lab Results   Component Value Date/Time     12/26/2024 07:35 AM    K 4.2 12/26/2024 07:35 AM     12/26/2024 07:35 AM    CO2 23 12/26/2024 07:35 AM    BUN 33 12/26/2024 07:35 AM    CREATININE 1.8 12/26/2024 07:35 AM    LABGLOM 39 12/26/2024 07:35 AM    GLUCOSE 104 12/26/2024 07:35 AM    GLUCOSE 116 02/27/2012 08:55 AM    CALCIUM 8.0

## 2025-01-30 ENCOUNTER — PREP FOR PROCEDURE (OUTPATIENT)
Dept: SURGERY | Age: 75
End: 2025-01-30

## 2025-01-30 DIAGNOSIS — K62.5 BRIGHT RED BLOOD PER RECTUM: ICD-10-CM

## 2025-01-30 DIAGNOSIS — Z86.0101 HISTORY OF ADENOMATOUS POLYP OF COLON: ICD-10-CM

## 2025-01-30 DIAGNOSIS — Z86.0100 HISTORY OF COLON POLYPS: ICD-10-CM

## 2025-01-30 DIAGNOSIS — K62.5 BRBPR (BRIGHT RED BLOOD PER RECTUM): Primary | ICD-10-CM

## 2025-02-27 ENCOUNTER — HOSPITAL ENCOUNTER (OUTPATIENT)
Age: 75
Discharge: HOME | End: 2025-02-27
Payer: MEDICARE

## 2025-02-27 ENCOUNTER — TELEPHONE (OUTPATIENT)
Dept: SURGERY | Age: 75
End: 2025-02-27

## 2025-02-27 DIAGNOSIS — K62.5 BRBPR (BRIGHT RED BLOOD PER RECTUM): Primary | ICD-10-CM

## 2025-02-27 DIAGNOSIS — K62.5 BRBPR (BRIGHT RED BLOOD PER RECTUM): ICD-10-CM

## 2025-02-27 LAB
INR PPP: 4
PROTHROMBIN TIME: 44.1 SEC (ref 9.3–12.4)

## 2025-02-27 PROCEDURE — 85610 PROTHROMBIN TIME: CPT

## 2025-02-27 PROCEDURE — 36415 COLL VENOUS BLD VENIPUNCTURE: CPT

## 2025-02-27 NOTE — TELEPHONE ENCOUNTER
Patient called in and asked if we could put a lab order in for his INR so he can have his scope on Monday. He states that he has not been able to reach anyone else to order it for him. INR lab ordered and is in chart.   Electronically signed by Cady Pittman MA on 2/27/2025 at 3:59 PM

## 2025-03-17 ENCOUNTER — TELEPHONE (OUTPATIENT)
Dept: SURGERY | Age: 75
End: 2025-03-17

## 2025-03-17 NOTE — TELEPHONE ENCOUNTER
Called patient to follow up on his INR results that were supposed to be faxed. HE states that they were faxed and his doctor said his INR last week was 3.1. He states that he had another one today and he will have those faxed to the hospital as well. Also provided him with our office fax number as well just incase.

## 2025-03-18 ENCOUNTER — ANESTHESIA EVENT (OUTPATIENT)
Dept: ENDOSCOPY | Age: 75
End: 2025-03-18
Payer: MEDICARE

## 2025-03-18 ENCOUNTER — HOSPITAL ENCOUNTER (OUTPATIENT)
Age: 75
Setting detail: OUTPATIENT SURGERY
Discharge: HOME | End: 2025-03-18
Attending: SURGERY | Admitting: SURGERY
Payer: MEDICARE

## 2025-03-18 ENCOUNTER — ANESTHESIA (OUTPATIENT)
Dept: ENDOSCOPY | Age: 75
End: 2025-03-18
Payer: MEDICARE

## 2025-03-18 ENCOUNTER — TELEPHONE (OUTPATIENT)
Dept: SURGERY | Age: 75
End: 2025-03-18

## 2025-03-18 VITALS
BODY MASS INDEX: 29.2 KG/M2 | WEIGHT: 204 LBS | TEMPERATURE: 97.4 F | RESPIRATION RATE: 15 BRPM | OXYGEN SATURATION: 97 % | HEIGHT: 70 IN | DIASTOLIC BLOOD PRESSURE: 58 MMHG | SYSTOLIC BLOOD PRESSURE: 121 MMHG | HEART RATE: 106 BPM

## 2025-03-18 DIAGNOSIS — K57.30 DIVERTICULOSIS LARGE INTESTINE W/O PERFORATION OR ABSCESS W/O BLEEDING: ICD-10-CM

## 2025-03-18 DIAGNOSIS — K62.5 BRBPR (BRIGHT RED BLOOD PER RECTUM): ICD-10-CM

## 2025-03-18 LAB
INR PPP: 1.1
PARTIAL THROMBOPLASTIN TIME: 29 SEC (ref 24.5–35.1)
PROTHROMBIN TIME: 11.8 SEC (ref 9.3–12.4)

## 2025-03-18 PROCEDURE — 3700000001 HC ADD 15 MINUTES (ANESTHESIA): Performed by: SURGERY

## 2025-03-18 PROCEDURE — 85610 PROTHROMBIN TIME: CPT

## 2025-03-18 PROCEDURE — 2580000003 HC RX 258

## 2025-03-18 PROCEDURE — 6360000002 HC RX W HCPCS

## 2025-03-18 PROCEDURE — 7100000010 HC PHASE II RECOVERY - FIRST 15 MIN: Performed by: SURGERY

## 2025-03-18 PROCEDURE — 3609027000 HC COLONOSCOPY: Performed by: SURGERY

## 2025-03-18 PROCEDURE — 7100000011 HC PHASE II RECOVERY - ADDTL 15 MIN: Performed by: SURGERY

## 2025-03-18 PROCEDURE — 85730 THROMBOPLASTIN TIME PARTIAL: CPT

## 2025-03-18 PROCEDURE — 45378 DIAGNOSTIC COLONOSCOPY: CPT | Performed by: SURGERY

## 2025-03-18 PROCEDURE — 2709999900 HC NON-CHARGEABLE SUPPLY: Performed by: SURGERY

## 2025-03-18 PROCEDURE — 3700000000 HC ANESTHESIA ATTENDED CARE: Performed by: SURGERY

## 2025-03-18 RX ORDER — SODIUM CHLORIDE 9 MG/ML
INJECTION, SOLUTION INTRAVENOUS PRN
Status: DISCONTINUED | OUTPATIENT
Start: 2025-03-18 | End: 2025-03-18 | Stop reason: HOSPADM

## 2025-03-18 RX ORDER — SODIUM CHLORIDE 9 MG/ML
INJECTION, SOLUTION INTRAVENOUS
Status: DISCONTINUED | OUTPATIENT
Start: 2025-03-18 | End: 2025-03-18 | Stop reason: SDUPTHER

## 2025-03-18 RX ORDER — LIDOCAINE HYDROCHLORIDE 20 MG/ML
INJECTION, SOLUTION EPIDURAL; INFILTRATION; INTRACAUDAL; PERINEURAL
Status: DISCONTINUED | OUTPATIENT
Start: 2025-03-18 | End: 2025-03-18 | Stop reason: SDUPTHER

## 2025-03-18 RX ORDER — PROPOFOL 10 MG/ML
INJECTION, EMULSION INTRAVENOUS
Status: DISCONTINUED | OUTPATIENT
Start: 2025-03-18 | End: 2025-03-18 | Stop reason: SDUPTHER

## 2025-03-18 RX ORDER — SODIUM CHLORIDE 0.9 % (FLUSH) 0.9 %
5-40 SYRINGE (ML) INJECTION EVERY 12 HOURS SCHEDULED
Status: DISCONTINUED | OUTPATIENT
Start: 2025-03-18 | End: 2025-03-18 | Stop reason: HOSPADM

## 2025-03-18 RX ORDER — SODIUM CHLORIDE 0.9 % (FLUSH) 0.9 %
5-40 SYRINGE (ML) INJECTION PRN
Status: DISCONTINUED | OUTPATIENT
Start: 2025-03-18 | End: 2025-03-18 | Stop reason: HOSPADM

## 2025-03-18 RX ADMIN — SODIUM CHLORIDE: 9 INJECTION, SOLUTION INTRAVENOUS at 12:05

## 2025-03-18 RX ADMIN — LIDOCAINE HYDROCHLORIDE 40 MG: 20 INJECTION, SOLUTION EPIDURAL; INFILTRATION; INTRACAUDAL; PERINEURAL at 12:36

## 2025-03-18 RX ADMIN — PROPOFOL 220 MG: 10 INJECTION, EMULSION INTRAVENOUS at 12:36

## 2025-03-18 ASSESSMENT — PAIN - FUNCTIONAL ASSESSMENT
PAIN_FUNCTIONAL_ASSESSMENT: 0-10
PAIN_FUNCTIONAL_ASSESSMENT: 0-10

## 2025-03-18 ASSESSMENT — LIFESTYLE VARIABLES: SMOKING_STATUS: 0

## 2025-03-18 NOTE — PROGRESS NOTES
Abdomen soft with active bowel sounds.  
Left message on Cady, at Dr. Travis's office, in regards to whether office has another other Pt/INR results  
Message left for Cady at Dr. Travis's office of INR 4.0 from 2/27/25.  
Samira KRUEGER  at Dr Travis office notified pt reports was instructed to hold coumadin 5 days prior to colonoscopy on 3/3/25 but took coumadin today 2/26/25. Also notified pt's last monthly PT/INR was 12/26/25 and pt instructed to get PT/INR  checked today or tomorrow, pt also aware PT/INR will be checked DOS.  
surgery (bring cases)      [] Please do not wear any nail polish, make up or hair products on the day of surgery    [x] You can expect a call the business day prior to procedure to notify you if your arrival time changes    [x] If you receive a survey after surgery we would greatly appreciate your comments    [] Parent/guardian of a minor must accompany their child and remain on the premises  the entire time they are under our care     [] Pediatric patients may bring favorite toy, blanket or comfort item with them    [] A caregiver or family member must remain with the patient during their stay if they are mentally handicapped, have dementia, disoriented or unable to use a call light or would be a safety concern if left unattended    [x] Please notify surgeon if you develop any illness between now and time of surgery (cold, cough, sore throat, fever, nausea, vomiting) or any signs of infections  including skin, wounds, and dental.    [x]  The Outpatient Pharmacy is available to fill your prescription here on your day of surgery, ask your preop nurse for details    [x] Other instructions Fax Number to send [facility] INR  [phone]    EDUCATIONAL MATERIALS PROVIDED:    [] PAT Preoperative Education Packet/Booklet     [] Medication List    [] Transfusion bracelet given to pt. Pt instructed to place on wrist or bring to hospital day of surgery. Informed that if bracelet lost or forgotten at home, bloodwork will have to repeated which could cause a delay in surgery.    [] Shower with soap, lather and rinse well, and use CHG wipes provided the evening before surgery as instructed    [] Incentive spirometer with instructions    
up or hair products on the day of surgery    [x] You can expect a call the business day prior to procedure to notify you if your arrival time changes    [x] If you receive a survey after surgery we would greatly appreciate your comments    [] Parent/guardian of a minor must accompany their child and remain on the premises  the entire time they are under our care     [] Pediatric patients may bring favorite toy, blanket or comfort item with them    [] A caregiver or family member must remain with the patient during their stay if they are mentally handicapped, have dementia, disoriented or unable to use a call light or would be a safety concern if left unattended    [x] Please notify surgeon if you develop any illness between now and time of surgery (cold, cough, sore throat, fever, nausea, vomiting) or any signs of infections  including skin, wounds, and dental.    [x]  The Outpatient Pharmacy is available to fill your prescription here on your day of surgery, ask your preop nurse for details    [] Other instructions    EDUCATIONAL MATERIALS PROVIDED:    [] PAT Preoperative Education Packet/Booklet     [] Medication List    [] Transfusion bracelet applied with instructions    [] Shower with soap, lather and rinse well, and use CHG wipes provided the evening before surgery as instructed    [] Incentive spirometer with instructions

## 2025-03-18 NOTE — DISCHARGE INSTRUCTIONS
[facility] Colonoscopy PROCEDURE DISCHARGE INSTRUCTIONS  You may be drowsy or lightheaded after receiving sedation or anesthesia.    A responsible person should be with you for the next 24 hours.    Please follow the instructions checked below:    DIET INSTRUCTIONS:  [x]Start with light diet and progress to your normal diet as you feel like eating. If you experience nausea or repeated episodes of vomiting which persist beyond 12-24 hours, notify your doctor.  []Other     ACTIVITY INSTRUCTIONS:  [x]Rest today. Increase activity as tolerated    []No heavy lifting or strenuous activity     [x]No driving for today  []Other      MEDICATION INSTRUCTIONS:    []Prescriptions sent with you.  Use as directed.  When taking pain medications, you may experience dizziness or drowsiness.  Do not drink alcohol or drive when taking these medications.  [x]Continue preop medications                               Post-procedure Care   If any tissue was removed:   It will be sent to a lab to be examined. It may take 1-2 weeks for results. The doctor will usually give an initial report after the scope is removed. Other tests may be recommended.   A small amount of bleeding may occur during the first few days after the procedure.     When you return home after the procedure, be sure to follow your doctor's instructions, which may include:   Resume medicines as instructed by your doctor.   Resume normal diet, unless directed otherwise by your doctor.   The sedative will make you drowsy. Avoid driving, operating machinery, or making important decisions for the rest of the day.   Rest for the remainder of the day.     After arriving home, contact your doctor if any of the following occurs:   Bleeding from your rectum, notify your doctor if you pass a teaspoonful of blood or more.   Black, tarry stools   Severe abdominal pain   Hard, swollen abdomen   Signs of infection, including fever or chills   Inability to pass gas or

## 2025-03-18 NOTE — TELEPHONE ENCOUNTER
Patient called in to let me know that his INR level from yesterday was a 1.0, still waiting for faxed results.

## 2025-03-18 NOTE — OP NOTE
Colonoscopy Op Note  PATIENT: Paresh Gallego    DATE OF PROCEDURE: 3/18/2025    SURGEON: Keanu Travis DO    PREOPERATIVE DIAGNOSIS: Diagnostic colonoscopy for diverticulosis and bright red blood per rectum    POSTOPERATIVE DIAGNOSIS: Pandiverticulosis, internal hemorrhoids    OPERATION: Procedure(s):  COLONOSCOPY DIAGNOSTIC    ANESTHESIA: Local monitored anesthesia.     ESTIMATED BLOOD LOSS: nil     COMPLICATIONS: None.     SPECIMENS:   * No specimens in log *    HISTORY: The patient is a 74 y.o. year old male with history of above preop diagnosis.  I recommended colonoscopy with possible biopsy or polypectomy and I explained the risk, benefits, expected outcome, and alternatives to the procedure.  Risks included but are not limited to bleeding, infection, respiratory distress, hypotension, and perforation of the colon.  The patient understands and is in agreement.        PROCEDURE: The patient was given IV conscious sedation per anesthesia. The patient was given supplemental oxygen by nasal cannula.  The colonoscope was inserted per rectum and advanced under direct vision to the cecum without difficulty, identified by appendiceal orifice, ileocecal valve, and light transillumination.  The prep was good so exam was adequate.    FINDINGS:    MELODIE: normal    Terminal Ileum: not examined    Colon:  Pandiverticulosis, no bleeding    Rectum/Anus: examined in normal and retroflexed positions -  internal hemorrhoids    The colon was decompressed and the scope was removed.  The withdraw time was approximately 12 minutes.  The patient tolerated the procedure well.     ASSESSMENT/PLAN:   Fiber diet  Colorectal Cancer Screening - recommend repeat colonoscopy in 10 years (may change pending biopsy results). Sooner if issues/concerns.    Keanu Travis DO  03/18/25  12:51 PM

## 2025-03-18 NOTE — ANESTHESIA PRE PROCEDURE
Department of Anesthesiology  Preprocedure Note       Name:  Paresh Gallego   Age:  74 y.o.  :  1950                                          MRN:  [id number]         Date:  3/18/2025      Surgeon: Surgeon(s):  Keanu Travis DO    Procedure: Procedure(s):  COLONOSCOPY DIAGNOSTIC    Medications prior to admission:   Prior to Admission medications    Medication Sig Start Date End Date Taking? Authorizing Provider   warfarin (COUMADIN) 5 MG tablet Take 1 tablet by mouth   Yes ProviderElena MD   albuterol (PROVENTIL) (2.5 MG/3ML) 0.083% nebulizer solution Take 3 mLs by nebulization in the morning and at bedtime PLEASE BILL UNDER MEDICARE PART B 24  Anatoly Rahman APRN - CNS   loratadine (CLARITIN) 10 MG capsule Take 1 capsule by mouth daily    ProviderElena MD       Current medications:    No current facility-administered medications for this encounter.     Current Outpatient Medications   Medication Sig Dispense Refill   • warfarin (COUMADIN) 5 MG tablet Take 1 tablet by mouth     • albuterol (PROVENTIL) (2.5 MG/3ML) 0.083% nebulizer solution Take 3 mLs by nebulization in the morning and at bedtime PLEASE BILL UNDER MEDICARE PART B 120 each 3   • loratadine (CLARITIN) 10 MG capsule Take 1 capsule by mouth daily         Allergies:  No Known Allergies    Problem List:    Patient Active Problem List   Diagnosis Code   • History of Pulmonary embolus ([facility]) I26.99   • Bladder cancer ([facility]) C67.9   • COVID-19 U07.1   • CKD stage 3a, GFR 45-59 ml/min ([facility]) N18.31   • WOLFGANG (acute kidney injury) N17.9   • Sepsis with acute renal failure without septic shock ([facility]) A41.9, R65.20, N17.9   • Sepsis with acute renal failure without septic shock, due to unspecified organism, unspecified acute renal failure type ([facility]) A41.9, R65.20, N17.9   • Acute cystitis without hematuria N30.00   • Hypertensive urgency I16.0   • Tachycardia R00.0   • Acute hypoxemic respiratory failure ([facility]) J96.01   • Acute

## 2025-03-18 NOTE — H&P
[facility] Surgery Clinic Note     Assessment/Plan:        Diagnosis Orders   1. BRBPR (bright red blood per rectum)  Initiate PAT Protocol     Full Code     Diet NPO Exceptions are: Sips of Water with Meds     Verify informed consent     Verify pre-procedure history and physical completed     Procedure Consent     sodium chloride flush 0.9 % injection 5-40 mL     sodium chloride flush 0.9 % injection 5-40 mL     0.9 % sodium chloride infusion       2. Diverticulosis large intestine w/o perforation or abscess w/o bleeding  Initiate PAT Protocol     Full Code     Diet NPO Exceptions are: Sips of Water with Meds     Verify informed consent     Verify pre-procedure history and physical completed     Procedure Consent     sodium chloride flush 0.9 % injection 5-40 mL     sodium chloride flush 0.9 % injection 5-40 mL     0.9 % sodium chloride infusion             Schedule for diagnostic colonoscopy, possible biopsy, possible polypectomy for BRPRB, diverticulosis history of adenomatous polyp in 2015     Hold Coumadin 5 days pre-procedure     I have explained the risks, benefits, alternatives, and potential complications associated with the proposed procedure to be performed and other issues when applicable with the patient/responsible party prior to the procedure. All of their questions were answered as appropriate and to their satisfaction. They understand and agree to the procedure.           Chief Complaint   Patient presents with    Follow-up       hosp f/u for colonoscopy            PCP: Live Freeman MD     HPI: Paresh Gallego is a 74 y.o. male who presents in consultation for hospital follow up for bright red blood per rectum. His INR was supratherapeutic at that time. The bleeding stopped while in hospital and he was discharged. He is here now to schedule colonoscopy. He states that there was one episode of minor bleeding since his hospitalization but it resolved sponataneusly. Denies abdominal pain, nausea

## 2025-03-18 NOTE — ANESTHESIA POSTPROCEDURE EVALUATION
Department of Anesthesiology  Postprocedure Note    Patient: Paresh Gallego  MRN: [id number]  YOB: 1950  Date of evaluation: 3/18/2025    Procedure Summary       Date: 03/18/25 Room / Location: [place] [address] / [place]    Anesthesia Start: 1228 Anesthesia Stop: 1253    Procedure: COLONOSCOPY DIAGNOSTIC Diagnosis:       Bright red blood per rectum      History of colon polyps      (Bright red blood per rectum [K62.5])      (History of colon polyps [Z86.0100])    Surgeons: Keanu Travis DO Responsible Provider: Blaise Amaya MD    Anesthesia Type: MAC ASA Status: 3            Anesthesia Type: MAC    Eve Phase I: Eve Score: 10    Eve Phase II: Eve Score: 10    Anesthesia Post Evaluation    Patient location during evaluation: PACU  Patient participation: complete - patient participated  Level of consciousness: awake  Airway patency: patent  Nausea & Vomiting: no nausea and no vomiting  Cardiovascular status: hemodynamically stable  Respiratory status: acceptable  Hydration status: stable  Pain management: adequate    No notable events documented.

## 2025-04-02 PROBLEM — N18.32 STAGE 3B CHRONIC KIDNEY DISEASE (HCC): Status: ACTIVE | Noted: 2024-07-20

## (undated) DEVICE — SPONGE GZ W4XL4IN RAYON POLY CVR W/NONWOVEN FAB STRL 2/PK

## (undated) DEVICE — GRADUATE TRIANG MEASURE 1000ML BLK PRNT